# Patient Record
Sex: FEMALE | Race: WHITE | Employment: OTHER | URBAN - METROPOLITAN AREA
[De-identification: names, ages, dates, MRNs, and addresses within clinical notes are randomized per-mention and may not be internally consistent; named-entity substitution may affect disease eponyms.]

---

## 2017-08-07 ENCOUNTER — OFFICE VISIT (OUTPATIENT)
Dept: RHEUMATOLOGY | Facility: CLINIC | Age: 77
End: 2017-08-07

## 2017-08-07 VITALS
SYSTOLIC BLOOD PRESSURE: 160 MMHG | BODY MASS INDEX: 24.4 KG/M2 | HEIGHT: 62.5 IN | HEART RATE: 74 BPM | WEIGHT: 136 LBS | DIASTOLIC BLOOD PRESSURE: 100 MMHG | RESPIRATION RATE: 16 BRPM

## 2017-08-07 DIAGNOSIS — M79.7 FIBROMYALGIA: Primary | ICD-10-CM

## 2017-08-07 DIAGNOSIS — G90.523 REFLEX SYMPATHETIC DYSTROPHY OF BOTH LOWER EXTREMITIES: ICD-10-CM

## 2017-08-07 PROCEDURE — 99213 OFFICE O/P EST LOW 20 MIN: CPT | Performed by: INTERNAL MEDICINE

## 2017-08-07 NOTE — PROGRESS NOTES
[facility]  Dr. Mane Banks Progress Note     Subjective:   Girma Tejada is a(n) 69year old female. Current complaints: Patient presents with:  Fibromyalgia Syndrome: LOV 8/1/17. Pt states she is feeling well- pain level 1-2/10.     Pacing her acti

## 2017-08-07 NOTE — PATIENT INSTRUCTIONS
Continue to exercise as best you can. Avoid over doing activity, pace your activity. No NSAIDs or even tylenol use due to your sensitivity. Use otc nutritional productions as tolerated. Return to office in 1 year.

## 2018-08-06 ENCOUNTER — OFFICE VISIT (OUTPATIENT)
Dept: RHEUMATOLOGY | Facility: CLINIC | Age: 78
End: 2018-08-06
Payer: MEDICARE

## 2018-08-06 VITALS
WEIGHT: 135 LBS | HEART RATE: 72 BPM | SYSTOLIC BLOOD PRESSURE: 138 MMHG | RESPIRATION RATE: 16 BRPM | BODY MASS INDEX: 24 KG/M2 | DIASTOLIC BLOOD PRESSURE: 62 MMHG

## 2018-08-06 DIAGNOSIS — M21.611 BILATERAL BUNIONS: ICD-10-CM

## 2018-08-06 DIAGNOSIS — G90.523 REFLEX SYMPATHETIC DYSTROPHY OF BOTH LOWER EXTREMITIES: ICD-10-CM

## 2018-08-06 DIAGNOSIS — M79.7 FIBROMYALGIA: Primary | ICD-10-CM

## 2018-08-06 DIAGNOSIS — M21.612 BILATERAL BUNIONS: ICD-10-CM

## 2018-08-06 PROCEDURE — 99213 OFFICE O/P EST LOW 20 MIN: CPT | Performed by: INTERNAL MEDICINE

## 2018-08-06 NOTE — PATIENT INSTRUCTIONS
Exercise as tolerated. No NSAIDs or tylenol due to allergies. Well balance low fat diet. Use otc vitamins. See podiatrist about your bunions,  Dr Harjit Alvarez. Return to office  In 1 year.

## 2018-08-06 NOTE — PROGRESS NOTES
[facility]  Dr. Garcia Exon Progress Note     Subjective:   Araceli Marques is a(n) 69year old female. Current complaints: Patient presents with:  Fibromyalgia Syndrome: RSD 1 year f/u.  Pt states 'RSD is changing a little bit.'   Mild intermittent bur

## 2019-08-12 ENCOUNTER — OFFICE VISIT (OUTPATIENT)
Dept: RHEUMATOLOGY | Facility: CLINIC | Age: 79
End: 2019-08-12
Payer: MEDICARE

## 2019-08-12 VITALS
HEIGHT: 62.5 IN | BODY MASS INDEX: 24.22 KG/M2 | WEIGHT: 135 LBS | RESPIRATION RATE: 20 BRPM | HEART RATE: 82 BPM | DIASTOLIC BLOOD PRESSURE: 86 MMHG | SYSTOLIC BLOOD PRESSURE: 128 MMHG

## 2019-08-12 DIAGNOSIS — G90.523 REFLEX SYMPATHETIC DYSTROPHY OF BOTH LOWER EXTREMITIES: Primary | ICD-10-CM

## 2019-08-12 DIAGNOSIS — M79.7 FIBROMYALGIA: ICD-10-CM

## 2019-08-12 PROCEDURE — 99213 OFFICE O/P EST LOW 20 MIN: CPT | Performed by: INTERNAL MEDICINE

## 2019-08-12 NOTE — PATIENT INSTRUCTIONS
Deal with one problem at a time and make improvements. See Dr Arville Babinski of [facility] for cataract surgery. Eat well balance diet low in fat. Stay active as bet you can. No NSAIDs due to allergies/intolerance issues.   Return to office 1 ye

## 2019-08-12 NOTE — PROGRESS NOTES
[facility]  Dr. Domínguez Nguyễn Progress Note     Subjective:   Vanessa Castellon is a(n) 67year old female.    Current complaints: Patient presents with:  Fibromyalgia Syndrome: 1 year f/u, had a horrible year, husbands health taking a toll  August, Pat's hus

## 2020-08-10 ENCOUNTER — OFFICE VISIT (OUTPATIENT)
Dept: RHEUMATOLOGY | Facility: CLINIC | Age: 80
End: 2020-08-10
Payer: MEDICARE

## 2020-08-10 VITALS
HEART RATE: 96 BPM | WEIGHT: 131 LBS | SYSTOLIC BLOOD PRESSURE: 146 MMHG | BODY MASS INDEX: 23.5 KG/M2 | DIASTOLIC BLOOD PRESSURE: 92 MMHG | TEMPERATURE: 100 F | HEIGHT: 62.5 IN | RESPIRATION RATE: 18 BRPM

## 2020-08-10 DIAGNOSIS — M79.7 FIBROMYALGIA: Primary | ICD-10-CM

## 2020-08-10 DIAGNOSIS — G90.523 REFLEX SYMPATHETIC DYSTROPHY OF BOTH LOWER EXTREMITIES: ICD-10-CM

## 2020-08-10 PROCEDURE — 99213 OFFICE O/P EST LOW 20 MIN: CPT | Performed by: INTERNAL MEDICINE

## 2020-08-10 NOTE — PATIENT INSTRUCTIONS
Maintain activity as best you can. Daily exercises. Well balanced diet. Follow Covid virus guidelines. 8 hours of sleep a night if possible. Return to office 1 year.

## 2020-08-10 NOTE — PROGRESS NOTES
[facility]  Dr. Robbin Brooks Progress Note     Subjective:   Segrio García is a(n) 79year old female.    Current complaints: Patient presents with:  Fibromyalgia Syndrome: Annuual f/u, RSD and fibro under control if activity monitored  Continue unchange

## 2021-08-10 ENCOUNTER — OFFICE VISIT (OUTPATIENT)
Dept: RHEUMATOLOGY | Facility: CLINIC | Age: 81
End: 2021-08-10
Payer: MEDICARE

## 2021-08-10 VITALS
OXYGEN SATURATION: 97 % | HEIGHT: 62 IN | SYSTOLIC BLOOD PRESSURE: 138 MMHG | HEART RATE: 111 BPM | WEIGHT: 129.63 LBS | DIASTOLIC BLOOD PRESSURE: 86 MMHG | TEMPERATURE: 99 F | BODY MASS INDEX: 23.85 KG/M2

## 2021-08-10 DIAGNOSIS — G90.513 COMPLEX REGIONAL PAIN SYNDROME TYPE 1 OF BOTH UPPER EXTREMITIES: ICD-10-CM

## 2021-08-10 DIAGNOSIS — G90.523 REFLEX SYMPATHETIC DYSTROPHY OF BOTH LOWER EXTREMITIES: Primary | ICD-10-CM

## 2021-08-10 DIAGNOSIS — M79.7 FIBROMYALGIA: ICD-10-CM

## 2021-08-10 PROCEDURE — 99213 OFFICE O/P EST LOW 20 MIN: CPT | Performed by: INTERNAL MEDICINE

## 2021-08-10 NOTE — PROGRESS NOTES
[facility]  Dr. Ty Banks Progress Note     Subjective:   Mary Foster is a(n) [de-identified]year old female. Current complaints: Patient presents with: Follow - Up: annual visit;   in January;  Covid vaccine 21 caused Fibro and RSD to flare;

## 2021-08-10 NOTE — PATIENT INSTRUCTIONS
Continue to exercise regularly. Magnesium can help muscle spasms, also CoQ 10 100 mg might help. Continue Calcium and Vit D in MVI for your bones. Well balance diet. 8 hours a sleep a night. Keep stress under control. Return to office 1 year.

## 2021-08-11 ENCOUNTER — TELEPHONE (OUTPATIENT)
Dept: RHEUMATOLOGY | Facility: CLINIC | Age: 81
End: 2021-08-11

## 2021-08-12 ENCOUNTER — TELEPHONE (OUTPATIENT)
Dept: RHEUMATOLOGY | Facility: CLINIC | Age: 81
End: 2021-08-12

## 2021-08-12 PROBLEM — G90.513 COMPLEX REGIONAL PAIN SYNDROME TYPE 1 OF BOTH UPPER EXTREMITIES: Status: ACTIVE | Noted: 2021-08-12

## 2021-08-12 NOTE — TELEPHONE ENCOUNTER
Returned pt's call; pt states her AVS indicated RSD to BLE only. Pt wants this corrected to reflect RSD to BUE as well. Routing to Dr Mi Smith as Ivonne Alfaro.

## 2021-08-12 NOTE — TELEPHONE ENCOUNTER
Pt was in office yesterday for office appt, would like Dr. Yon Soto to add diagnosis of RDS to upper extremities.

## 2022-04-15 ENCOUNTER — TELEPHONE (OUTPATIENT)
Dept: RHEUMATOLOGY | Facility: CLINIC | Age: 82
End: 2022-04-15

## 2022-08-15 ENCOUNTER — OFFICE VISIT (OUTPATIENT)
Dept: RHEUMATOLOGY | Facility: CLINIC | Age: 82
End: 2022-08-15
Payer: MEDICARE

## 2022-08-15 VITALS
HEIGHT: 62 IN | HEART RATE: 99 BPM | WEIGHT: 118 LBS | SYSTOLIC BLOOD PRESSURE: 142 MMHG | BODY MASS INDEX: 21.71 KG/M2 | OXYGEN SATURATION: 96 % | TEMPERATURE: 100 F | DIASTOLIC BLOOD PRESSURE: 90 MMHG

## 2022-08-15 DIAGNOSIS — M79.7 FIBROMYALGIA: Primary | ICD-10-CM

## 2022-08-15 DIAGNOSIS — G90.513 COMPLEX REGIONAL PAIN SYNDROME TYPE 1 OF BOTH UPPER EXTREMITIES: ICD-10-CM

## 2022-08-15 DIAGNOSIS — G90.523 REFLEX SYMPATHETIC DYSTROPHY OF BOTH LOWER EXTREMITIES: ICD-10-CM

## 2022-08-15 PROBLEM — Z98.42 HISTORY OF LEFT CATARACT SURGERY: Status: ACTIVE | Noted: 2022-08-15

## 2022-08-15 PROCEDURE — 99214 OFFICE O/P EST MOD 30 MIN: CPT | Performed by: INTERNAL MEDICINE

## 2022-08-15 NOTE — PATIENT INSTRUCTIONS
Use a heating pad as needed. Trial of antioxiadants   OTC salves. Medical marijuana is a pain option -CBD. No narcotics or NSAIDs due to allergies and intolerance. Exercise and activity regularly. RTO 1 year.

## 2023-08-16 ENCOUNTER — OFFICE VISIT (OUTPATIENT)
Dept: RHEUMATOLOGY | Facility: CLINIC | Age: 83
End: 2023-08-16
Payer: MEDICARE

## 2023-08-16 VITALS
OXYGEN SATURATION: 96 % | WEIGHT: 111 LBS | DIASTOLIC BLOOD PRESSURE: 90 MMHG | HEIGHT: 62 IN | HEART RATE: 100 BPM | TEMPERATURE: 98 F | BODY MASS INDEX: 20.43 KG/M2 | SYSTOLIC BLOOD PRESSURE: 170 MMHG | RESPIRATION RATE: 16 BRPM

## 2023-08-16 DIAGNOSIS — G90.523 REFLEX SYMPATHETIC DYSTROPHY OF BOTH LOWER EXTREMITIES: ICD-10-CM

## 2023-08-16 DIAGNOSIS — G90.513 COMPLEX REGIONAL PAIN SYNDROME TYPE 1 OF BOTH UPPER EXTREMITIES: Primary | ICD-10-CM

## 2023-08-16 DIAGNOSIS — T50.Z95D ADVERSE EFFECT OF VACCINE, SUBSEQUENT ENCOUNTER: ICD-10-CM

## 2023-08-16 DIAGNOSIS — T50.B95A FATIGUE AFTER COVID-19 VACCINATION: ICD-10-CM

## 2023-08-16 DIAGNOSIS — M79.7 FIBROMYALGIA: ICD-10-CM

## 2023-08-16 DIAGNOSIS — R53.83 FATIGUE AFTER COVID-19 VACCINATION: ICD-10-CM

## 2023-08-16 PROBLEM — T50.Z95A VACCINATION REACTION: Status: ACTIVE | Noted: 2023-08-16

## 2023-08-16 PROCEDURE — 99213 OFFICE O/P EST LOW 20 MIN: CPT | Performed by: INTERNAL MEDICINE

## 2023-08-16 NOTE — PATIENT INSTRUCTIONS
Proper rest. Well balanced diet. Take Vitamin B supplements and multivitamins. Muscle spasm medication offered, but patient refuses it. Exercise regularly. Lyrica/cymbalta no help. Low ose naltrexone is an option for Fibromyalgia. RTO  1 year.

## 2024-01-21 ENCOUNTER — APPOINTMENT (OUTPATIENT)
Dept: GENERAL RADIOLOGY | Age: 84
End: 2024-01-21
Attending: EMERGENCY MEDICINE

## 2024-01-21 ENCOUNTER — APPOINTMENT (OUTPATIENT)
Dept: CT IMAGING | Age: 84
End: 2024-01-21
Attending: ORTHOPAEDIC SURGERY

## 2024-01-21 ENCOUNTER — APPOINTMENT (OUTPATIENT)
Dept: GENERAL RADIOLOGY | Age: 84
End: 2024-01-21
Attending: STUDENT IN AN ORGANIZED HEALTH CARE EDUCATION/TRAINING PROGRAM

## 2024-01-21 ENCOUNTER — HOSPITAL ENCOUNTER (EMERGENCY)
Age: 84
Discharge: HOME | End: 2024-01-21
Attending: STUDENT IN AN ORGANIZED HEALTH CARE EDUCATION/TRAINING PROGRAM | Admitting: INTERNAL MEDICINE

## 2024-01-21 VITALS
OXYGEN SATURATION: 96 % | RESPIRATION RATE: 10 BRPM | DIASTOLIC BLOOD PRESSURE: 88 MMHG | HEART RATE: 109 BPM | SYSTOLIC BLOOD PRESSURE: 148 MMHG | TEMPERATURE: 98.2 F | WEIGHT: 107.36 LBS

## 2024-01-21 DIAGNOSIS — S43.491A BANKART LESION OF RIGHT SHOULDER, INITIAL ENCOUNTER: ICD-10-CM
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
## 2024-01-21 PROBLEM — M79.7 FIBROMYALGIA: Status: ACTIVE | Noted: 2024-01-21

## 2024-01-21 PROBLEM — S43.004A SHOULDER DISLOCATION, RIGHT, INITIAL ENCOUNTER: Status: ACTIVE | Noted: 2024-01-21

## 2024-01-21 PROBLEM — I10 ESSENTIAL HYPERTENSION: Status: ACTIVE | Noted: 2022-01-07

## 2024-01-21 PROCEDURE — 73562 X-RAY EXAM OF KNEE 3: CPT

## 2024-01-21 PROCEDURE — 10002800 HB RX 250 W HCPCS: Performed by: STUDENT IN AN ORGANIZED HEALTH CARE EDUCATION/TRAINING PROGRAM

## 2024-01-21 PROCEDURE — 73200 CT UPPER EXTREMITY W/O DYE: CPT

## 2024-01-21 PROCEDURE — 10004180 HB COUNTER-TRANSPORT

## 2024-01-21 PROCEDURE — 73030 X-RAY EXAM OF SHOULDER: CPT

## 2024-01-21 RX ORDER — HYDRALAZINE HYDROCHLORIDE 20 MG/ML
10 INJECTION INTRAMUSCULAR; INTRAVENOUS EVERY 6 HOURS PRN
Status: CANCELLED | OUTPATIENT
Start: 2024-01-21

## 2024-01-21 RX ORDER — POLYETHYLENE GLYCOL 3350 17 G/17G
17 POWDER, FOR SOLUTION ORAL DAILY PRN
Status: CANCELLED | OUTPATIENT
Start: 2024-01-21

## 2024-01-21 RX ORDER — THIAMINE HCL 100 MG
1 TABLET ORAL DAILY
COMMUNITY

## 2024-01-21 RX ORDER — PROPOFOL 10 MG/ML
1 INJECTION, EMULSION INTRAVENOUS ONCE
Status: COMPLETED | OUTPATIENT
Start: 2024-01-21 | End: 2024-01-21

## 2024-01-21 RX ORDER — ONDANSETRON 4 MG/1
4 TABLET, ORALLY DISINTEGRATING ORAL EVERY 12 HOURS PRN
Status: CANCELLED | OUTPATIENT
Start: 2024-01-21

## 2024-01-21 RX ORDER — MULTIVITAMIN,THER AND MINERALS
1 TABLET ORAL DAILY
COMMUNITY

## 2024-01-21 RX ORDER — ONDANSETRON 2 MG/ML
4 INJECTION INTRAMUSCULAR; INTRAVENOUS EVERY 6 HOURS PRN
Status: CANCELLED | OUTPATIENT
Start: 2024-01-21

## 2024-01-21 RX ORDER — 0.9 % SODIUM CHLORIDE 0.9 %
2 VIAL (ML) INJECTION EVERY 12 HOURS SCHEDULED
Status: CANCELLED | OUTPATIENT
Start: 2024-01-21

## 2024-01-21 RX ORDER — LANOLIN ALCOHOL/MO/W.PET/CERES
3 CREAM (GRAM) TOPICAL NIGHTLY PRN
Status: CANCELLED | OUTPATIENT
Start: 2024-01-21

## 2024-01-21 RX ADMIN — PROPOFOL 49 MG: 10 INJECTION, EMULSION INTRAVENOUS at 16:06

## 2024-01-21 SDOH — SOCIAL STABILITY: SOCIAL INSECURITY: HOW OFTEN DOES ANYONE, INCLUDING FAMILY AND FRIENDS, PHYSICALLY HURT YOU?: NEVER

## 2024-01-21 SDOH — SOCIAL STABILITY: SOCIAL INSECURITY: HOW OFTEN DOES ANYONE, INCLUDING FAMILY AND FRIENDS, SCREAM OR CURSE AT YOU?: NEVER

## 2024-01-21 SDOH — SOCIAL STABILITY: SOCIAL INSECURITY: HOW OFTEN DOES ANYONE, INCLUDING FAMILY AND FRIENDS, INSULT OR TALK DOWN TO YOU?: NEVER

## 2024-01-21 ASSESSMENT — PAIN SCALES - GENERAL
PAINLEVEL_OUTOF10: 0
PAINLEVEL_OUTOF10: 5

## 2024-01-21 ASSESSMENT — LIFESTYLE VARIABLES: SMOKING_HISTORY: NO

## 2024-01-29 ENCOUNTER — LAB ENCOUNTER (OUTPATIENT)
Dept: LAB | Age: 84
End: 2024-01-29
Attending: ORTHOPAEDIC SURGERY
Payer: MEDICARE

## 2024-01-29 DIAGNOSIS — Z01.818 PRE-OP TESTING: ICD-10-CM

## 2024-01-29 PROCEDURE — 87081 CULTURE SCREEN ONLY: CPT

## 2024-02-02 ENCOUNTER — APPOINTMENT (OUTPATIENT)
Dept: GENERAL RADIOLOGY | Facility: HOSPITAL | Age: 84
End: 2024-02-02
Attending: ORTHOPAEDIC SURGERY
Payer: MEDICARE

## 2024-02-02 ENCOUNTER — ANESTHESIA (OUTPATIENT)
Dept: SURGERY | Facility: HOSPITAL | Age: 84
End: 2024-02-02
Payer: MEDICARE

## 2024-02-02 ENCOUNTER — HOSPITAL ENCOUNTER (OUTPATIENT)
Facility: HOSPITAL | Age: 84
Setting detail: HOSPITAL OUTPATIENT SURGERY
Discharge: HOME | End: 2024-02-02
Attending: ORTHOPAEDIC SURGERY | Admitting: ORTHOPAEDIC SURGERY
Payer: MEDICARE

## 2024-02-02 ENCOUNTER — ANESTHESIA EVENT (OUTPATIENT)
Dept: SURGERY | Facility: HOSPITAL | Age: 84
End: 2024-02-02
Payer: MEDICARE

## 2024-02-02 VITALS
HEART RATE: 87 BPM | DIASTOLIC BLOOD PRESSURE: 89 MMHG | BODY MASS INDEX: 19.32 KG/M2 | HEIGHT: 62 IN | WEIGHT: 105 LBS | RESPIRATION RATE: 16 BRPM | TEMPERATURE: 98 F | SYSTOLIC BLOOD PRESSURE: 170 MMHG | OXYGEN SATURATION: 93 %

## 2024-02-02 DIAGNOSIS — Z01.818 PRE-OP TESTING: Primary | ICD-10-CM

## 2024-02-02 PROCEDURE — 64415 NJX AA&/STRD BRCH PLXS IMG: CPT | Performed by: ORTHOPAEDIC SURGERY

## 2024-02-02 PROCEDURE — 0PS704Z REPOSITION RIGHT GLENOID CAVITY WITH INTERNAL FIXATION DEVICE, OPEN APPROACH: ICD-10-PCS | Performed by: ORTHOPAEDIC SURGERY

## 2024-02-02 RX ORDER — HYDROMORPHONE HYDROCHLORIDE 1 MG/ML
0.6 INJECTION, SOLUTION INTRAMUSCULAR; INTRAVENOUS; SUBCUTANEOUS EVERY 5 MIN PRN
OUTPATIENT
Start: 2024-02-02

## 2024-02-02 RX ORDER — NALOXONE HYDROCHLORIDE 0.4 MG/ML
80 INJECTION, SOLUTION INTRAMUSCULAR; INTRAVENOUS; SUBCUTANEOUS AS NEEDED
OUTPATIENT
Start: 2024-02-02 | End: 2024-02-02

## 2024-02-02 RX ORDER — ONDANSETRON 2 MG/ML
4 INJECTION INTRAMUSCULAR; INTRAVENOUS EVERY 6 HOURS PRN
OUTPATIENT
Start: 2024-02-02

## 2024-02-02 RX ORDER — HYDROMORPHONE HYDROCHLORIDE 1 MG/ML
0.4 INJECTION, SOLUTION INTRAMUSCULAR; INTRAVENOUS; SUBCUTANEOUS EVERY 5 MIN PRN
OUTPATIENT
Start: 2024-02-02

## 2024-02-02 RX ORDER — SODIUM CHLORIDE, SODIUM LACTATE, POTASSIUM CHLORIDE, CALCIUM CHLORIDE 600; 310; 30; 20 MG/100ML; MG/100ML; MG/100ML; MG/100ML
INJECTION, SOLUTION INTRAVENOUS CONTINUOUS
OUTPATIENT
Start: 2024-02-02

## 2024-02-02 RX ORDER — HYDROMORPHONE HYDROCHLORIDE 1 MG/ML
0.2 INJECTION, SOLUTION INTRAMUSCULAR; INTRAVENOUS; SUBCUTANEOUS EVERY 5 MIN PRN
OUTPATIENT
Start: 2024-02-02

## 2024-02-02 RX ORDER — LABETALOL HYDROCHLORIDE 5 MG/ML
INJECTION, SOLUTION INTRAVENOUS AS NEEDED
Status: DISCONTINUED | OUTPATIENT
Start: 2024-02-02 | End: 2024-02-02 | Stop reason: SURG

## 2024-02-02 RX ORDER — MORPHINE SULFATE 4 MG/ML
4 INJECTION, SOLUTION INTRAMUSCULAR; INTRAVENOUS EVERY 10 MIN PRN
OUTPATIENT
Start: 2024-02-02

## 2024-02-02 RX ORDER — SODIUM CHLORIDE, SODIUM LACTATE, POTASSIUM CHLORIDE, CALCIUM CHLORIDE 600; 310; 30; 20 MG/100ML; MG/100ML; MG/100ML; MG/100ML
INJECTION, SOLUTION INTRAVENOUS CONTINUOUS
Status: DISCONTINUED | OUTPATIENT
Start: 2024-02-02 | End: 2024-02-02

## 2024-02-02 RX ORDER — ONDANSETRON 2 MG/ML
4 INJECTION INTRAMUSCULAR; INTRAVENOUS EVERY 6 HOURS PRN
Status: DISCONTINUED | OUTPATIENT
Start: 2024-02-02 | End: 2024-02-02

## 2024-02-02 RX ORDER — MORPHINE SULFATE 2 MG/ML
2 INJECTION, SOLUTION INTRAMUSCULAR; INTRAVENOUS EVERY 10 MIN PRN
OUTPATIENT
Start: 2024-02-02

## 2024-02-02 RX ORDER — ROPIVACAINE HYDROCHLORIDE 5 MG/ML
INJECTION, SOLUTION EPIDURAL; INFILTRATION; PERINEURAL
Status: COMPLETED | OUTPATIENT
Start: 2024-02-02 | End: 2024-02-02

## 2024-02-02 RX ORDER — MORPHINE SULFATE 10 MG/ML
6 INJECTION, SOLUTION INTRAMUSCULAR; INTRAVENOUS EVERY 10 MIN PRN
OUTPATIENT
Start: 2024-02-02

## 2024-02-02 RX ORDER — METOCLOPRAMIDE HYDROCHLORIDE 5 MG/ML
10 INJECTION INTRAMUSCULAR; INTRAVENOUS EVERY 8 HOURS PRN
OUTPATIENT
Start: 2024-02-02

## 2024-02-02 RX ADMIN — LABETALOL HYDROCHLORIDE 10 MG: 5 INJECTION, SOLUTION INTRAVENOUS at 12:55:00

## 2024-02-02 RX ADMIN — ROPIVACAINE HYDROCHLORIDE 25 ML: 5 INJECTION, SOLUTION EPIDURAL; INFILTRATION; PERINEURAL at 11:22:00

## 2024-02-02 NOTE — DISCHARGE INSTRUCTIONS
Call us after your see your PCP and have your blood pressure checked    [facility] INSTRUCTIONS: POST-OP ANESTHESIA  The medication that you received for sedation or general anesthesia can last up to 24 hours. Your judgment and reflexes may be altered, even if you feel like your normal self.      We Recommend:   Do not drive any motor vehicle or bicycle   Avoid mowing the lawn, playing sports, or working with power tools/applicances (power saws, electric knives or mixers)   That you have someone stay with you on your first night home   Do not drink alcohol or take sleeping pills or tranquilizers   Do not sign legal documents within 24 hours of your procedure   If you had a nerve block for your surgery, take extra care not to put any pressure on your arm or hand for 24 hours    It is normal:  For you to have a sore throat if you had a breathing tube during surgery (while you were asleep!). The sore throat should get better within 48 hours. You can gargle with warm salt water (1/2 tsp in 4 oz warm water) or use a throat lozenge for comfort  To feel muscle aches or soreness especially in the abdomen, chest or neck. The achy feeling should go away in the next 24 hours  To feel weak, sleepy or \"wiped out\". Your should start feeling better in the next 24 hours.   To experience mild discomforts such as sore lip or tongue, headache, cramps, gas pains or a bloated feeling in your abdomen.   To experience mild back pain or soreness for a day or two if you had spinal or epidural anesthesia.   If you had laparoscopic surgery, to feel shoulder pain or discomfort on the day of surgery.   For some patients to have nausea after surgery/anesthesia    If you feel nausea or experience vomiting:   Try to move around less.   Eat less than usual or drink only liquids until the next morning   Nausea should resolve in about 24 hours    If you have a problem when you are at home:    Call your surgeons office   Discharge Instructions:  After Your Surgery  You’ve just had surgery. During surgery, you were given medicine called anesthesia to keep you relaxed and free of pain. After surgery, you may have some pain or nausea. This is common. Here are some tips for feeling better and getting well after surgery.   Going home  Your healthcare provider will show you how to take care of yourself when you go home. They'll also answer your questions. Have an adult family member or friend drive you home. For the first 24 hours after your surgery:   Don't drive or use heavy equipment.  Don't make important decisions or sign legal papers.  Take medicines as directed.  Don't drink alcohol.  Have someone stay with you, if needed. They can watch for problems and help keep you safe.  Be sure to go to all follow-up visits with your healthcare provider. And rest after your surgery for as long as your provider tells you to.   Coping with pain  If you have pain after surgery, pain medicine will help you feel better. Take it as directed, before pain becomes severe. Also, ask your healthcare provider or pharmacist about other ways to control pain. This might be with heat, ice, or relaxation. And follow any other instructions your surgeon or nurse gives you.      Stay on schedule with your medicine.     Tips for taking pain medicine  To get the best relief possible, remember these points:   Pain medicines can upset your stomach. Taking them with a little food may help.  Most pain relievers taken by mouth need at least 20 to 30 minutes to start to work.  Don't wait till your pain becomes severe before you take your medicine. Try to time your medicine so that you can take it before starting an activity. This might be before you get dressed, go for a walk, or sit down for dinner.  Constipation is a common side effect of some pain medicines. Call your healthcare provider before taking any medicines such as laxatives or stool softeners to help ease constipation. Also ask if  you should skip any foods. Drinking lots of fluids and eating foods such as fruits and vegetables that are high in fiber can also help. Remember, don't take laxatives unless your surgeon has prescribed them.  Drinking alcohol and taking pain medicine can cause dizziness and slow your breathing. It can even be deadly. Don't drink alcohol while taking pain medicine.  Pain medicine can make you react more slowly to things. Don't drive or run machinery while taking pain medicine.  Your healthcare provider may tell you to take acetaminophen to help ease your pain. Ask them how much you're supposed to take each day. Acetaminophen or other pain relievers may interact with your prescription medicines or other over-the-counter (OTC) medicines. Some prescription medicines have acetaminophen and other ingredients in them. Using both prescription and OTC acetaminophen for pain can cause you to accidentally overdose. Read the labels on your OTC medicines with care. This will help you to clearly know the list of ingredients, how much to take, and any warnings. It may also help you not take too much acetaminophen. If you have questions or don't understand the information, ask your pharmacist or healthcare provider to explain it to you before you take the OTC medicine.   Managing nausea  Some people have an upset stomach (nausea) after surgery. This is often because of anesthesia, pain, or pain medicine, less movement of food in the stomach, or the stress of surgery. These tips will help you handle nausea and eat healthy foods as you get better. If you were on a special food plan before surgery, ask your healthcare provider if you should follow it while you get better. Check with your provider on how your eating should progress. It may depend on the surgery you had. These general tips may help:   Don't push yourself to eat. Your body will tell you when to eat and how much.  Start off with clear liquids and soup. They're easier to  digest.  Next try semi-solid foods as you feel ready. These include mashed potatoes, applesauce, and gelatin.  Slowly move to solid foods. Don’t eat fatty, rich, or spicy foods at first.  Don't force yourself to have 3 large meals a day. Instead eat smaller amounts more often.  Take pain medicines with a small amount of solid food, such as crackers or toast. This helps prevent nausea.  When to call your healthcare provider  Call your healthcare provider right away if you have any of these:   You still have too much pain, or the pain gets worse, after taking the medicine. The medicine may not be strong enough. Or there may be a complication from the surgery.  You feel too sleepy, dizzy, or groggy. The medicine may be too strong.  Side effects such as nausea or vomiting. Your healthcare provider may advise taking other medicines to .  Skin changes such as rash, itching, or hives. This may mean you have an allergic reaction. Your provider may advise taking other medicines.  The incision looks different (for instance, part of it opens up).  Bleeding or fluid leaking from the incision site, and weren't told to expect that.  Fever of 100.4°F (38°C) or higher, or as directed by your provider.  Call 911  Call 911 right away if you have:   Trouble breathing  Facial swelling    If you have obstructive sleep apnea   You were given anesthesia medicine during surgery to keep you comfortable and free of pain. After surgery, you may have more apnea spells because of this medicine and other medicines you were given. The spells may last longer than normal.    At home:  Keep using the continuous positive airway pressure (CPAP) device when you sleep. Unless your healthcare provider tells you not to, use it when you sleep, day or night. CPAP is a common device used to treat obstructive sleep apnea.  Talk with your provider before taking any pain medicine, muscle relaxants, or sedatives. Your provider will tell you about the possible  dangers of taking these medicines.  Contact your provider if your sleeping changes a lot even when taking medicines as directed.  Ron last reviewed this educational content on 10/1/2021  © 3679-5488 [employer]. All rights reserved. This information is not intended as a substitute for professional medical care. Always follow your healthcare professional's instructions.

## 2024-02-02 NOTE — ANESTHESIA PREPROCEDURE EVALUATION
Anesthesia PreOp Note    HPI:     Dulce Nicolas is a 83 year old female who presents for preoperative consultation requested by: Gabriella Murry MD    Date of Surgery: 2/2/2024    Procedure(s):  Right glenoid open reduction internal fixation with possible trillat procedure  Indication: Anterior dislocation of right shoulder, initial encounter, acute pain of right shoulder, glenoid fracture of shoulder, right, closed initial encounter    Relevant Problems   No relevant active problems       NPO:                         History Review:  Patient Active Problem List    Diagnosis Date Noted    Vaccination reaction 08/16/2023    Fatigue after COVID-19 vaccination 08/16/2023    Histroy of left cataract surgery 2-10-22 08/15/2022    Complex regional pain syndrome type 1 of both upper extremities 08/12/2021    Bilateral bunions 08/06/2018    Fibromyalgia 08/01/2016    Reflex sympathetic dystrophy of both lower extremities 08/01/2016    Varicose veins of both lower extremities 08/01/2016    Primary localized osteoarthrosis, hand 01/15/2015    Contusion of right hand 01/15/2015    Sprain of left thumb 01/15/2015    Residual foreign body in soft tissue 06/20/2013       Past Medical History:   Diagnosis Date    Cataract     Esophageal reflux     gets dilated     Fibromyalgia     Hearing impairment     bilateral hearing aids    Pneumonia due to organism     age 4. and in 1996     PONV (postoperative nausea and vomiting)     RSD (reflex sympathetic dystrophy) 1996       Past Surgical History:   Procedure Laterality Date    ANKLE FRACTURE SURGERY      Rt X 2    CATARACT      Corneal Transplants    OTHER SURGICAL HISTORY      Rt 3rd finger after tip was amputated    OTHER SURGICAL HISTORY      cysts removed pilondial     UPPER GI ENDOSCOPY,EXAM         Medications Prior to Admission   Medication Sig Dispense Refill Last Dose    HONEY OR Take by mouth. 1 tsp daily   Past Week    Homeopathic Products (COLDCALM SL) Place under the  tongue daily as needed.   Past Week    MULTI-VITAMIN OR None Entered   Past Week    CALCIUM + D OR None Entered   2/1/2024    MAGNESIUM OR None Entered   2/1/2024    VITAMIN C OR Take by mouth at bedtime.   Past Week     Current Facility-Administered Medications Ordered in Epic   Medication Dose Route Frequency Provider Last Rate Last Admin    lactated ringers infusion   Intravenous Continuous Gabriella Murry MD        vancomycin (Vancocin) 750 mg in sodium chloride 0.9% 250 mL IVPB-ADDV  15 mg/kg Intravenous Once Gabriella Murry MD        ondansetron (Zofran) 4 MG/2ML injection 4 mg  4 mg Intravenous Q6H PRN Gabriella Murry MD         No current [facility]-ordered outpatient medications on file.       Allergies   Allergen Reactions    Asa [Aspirin] OTHER (SEE COMMENTS)     All NSAIDS     Bee SWELLING    Beck Rdz Daytime [Menthol] OTHER (SEE COMMENTS)     Rash , itching, chills n/v    Chlorine OTHER (SEE COMMENTS)     HA rash, itching     Ciprofloxacin ANAPHYLAXIS    Clindamycin Hcl ANAPHYLAXIS     Gave heart palp, chest pain , HA     Codeine [Opioid Analgesics] OTHER (SEE COMMENTS)     Rash itching HA chills    Crabs (Crustaceans) ANAPHYLAXIS     Throat swelling    Cranberry ANAPHYLAXIS     Watermelon causes throat closing     Darvon [Propoxyphene] OTHER (SEE COMMENTS)     Chills, fever, HA itching     Doxycycline OTHER (SEE COMMENTS)     Caused her to have muscle aches/pains , unable to move hands/ feet , caused memory issues     Dulcolax OTHER (SEE COMMENTS)     Caused insides to burn     Elavil [Amitriptyline Hcl] OTHER (SEE COMMENTS)     Worse symptms of RSD     Entex ANAPHYLAXIS     Same as Elivail     Entex La OTHER (SEE COMMENTS)     Passed out, N/V , diarrhea     Erythromycin OTHER (SEE COMMENTS)     Passing out / HTN  Went to ER     Ibuprofen OTHER (SEE COMMENTS)     Allergic to ALL NSAIDS, caused bleeding, chills rash     Iodoform OTHER (SEE COMMENTS)     Iodine caused buring, bleeding     Keflex  ANAPHYLAXIS    Lovenox [Enoxaparin] OTHER (SEE COMMENTS)     Caused bruising/ bleeding     Neurontin [Gabapentin] OTHER (SEE COMMENTS)     Caused worse symptoms of RSD     Norflex [Orphenadrine Citrate Cr] OTHER (SEE COMMENTS)     Caused worse symptoms of RSD     Other ANAPHYLAXIS     HYDROCHLORIC ACID/HCL, neoprene caused cellultis , ARTIFICAL SWEETNERS  Cause n/v diarrhea   Cant use eye drops     Penicillins ANAPHYLAXIS    Prednisolone OTHER (SEE COMMENTS)     Eye gtts - increased BP    Prilosec [Omeprazole Magnesium] ANAPHYLAXIS     Cardiac issues     Silicone SWELLING     Cellulitis    Sulfa Antibiotics ANAPHYLAXIS    Tetracycline [Tetracycline Hcl] ANAPHYLAXIS     Doxycycline caused RSD / fibromyalgia     Triamcinolone OTHER (SEE COMMENTS)     caused cellultits all over     Tylenol [Acetaminophen] ANAPHYLAXIS     Fainting had to go to ER     Ultram [Tramadol Hcl] OTHER (SEE COMMENTS)     Chills, rash itching HA, caused worse RSD symptoms     Alcohol RASH and NAUSEA AND VOMITING    Fish Oil OTHER (SEE COMMENTS)     Vitamin e caused chills, nv/ HA ,     Kaolin OTHER (SEE COMMENTS)     N/v , diarrhea     Dust     Loteprednol OTHER (SEE COMMENTS)     High blood pressure, double vision    Mold OTHER (SEE COMMENTS)     Caused rash  Chill, Kennedy itching     Valium OTHER (SEE COMMENTS)     HA, rash , chills, itching     Chlorpheniramine NAUSEA ONLY    Diphenhydramine RASH     cream    Tea Tree Oil ITCHING       Family History   Problem Relation Age of Onset    Heart Attack Father     Breast Cancer Mother     Breast Cancer Sister     Heart Attack Brother     Diabetes Sister      Social History     Socioeconomic History    Marital status:    Tobacco Use    Smoking status: Never    Smokeless tobacco: Never   Vaping Use    Vaping Use: Never used   Substance and Sexual Activity    Alcohol use: No    Drug use: No   Other Topics Concern    Seat Belt Yes       Available pre-op labs reviewed.             Vital Signs:  Body  mass index is 19.57 kg/m².   height is 1.575 m (5' 2\") and weight is 48.5 kg (107 lb).   Vitals:    01/27/24 1017   Weight: 48.5 kg (107 lb)   Height: 1.575 m (5' 2\")        Anesthesia Evaluation     Patient summary reviewed and Nursing notes reviewed    No history of anesthetic complications   Airway   Mallampati: II  TM distance: >3 FB  Neck ROM: full  Dental      Pulmonary - negative ROS    breath sounds clear to auscultation  (-) COPD, asthma, sleep apnea  Cardiovascular - negative ROS  Exercise tolerance: good  (-) hypertension, CAD, CHF    Rhythm: regular  Rate: normal    Neuro/Psych    (-) CVA    Comments: CPRS    GI/Hepatic/Renal    (+) GERD  (-) liver disease, renal disease    Endo/Other - negative ROS   (-) diabetes mellitus, hypothyroidism  Abdominal                  Anesthesia Plan:   ASA:  2  Plan:   General and regional  Airway:  ETT  Post-op Pain Management: Interscalene block  Plan Comments: Pt and daughter had numerous questions and hesitancy regarding receiving general anesthesia given that pt has multiple allergies. Pt does not want any antiemetic medication.   Informed Consent Plan and Risks Discussed With:  Patient  Consent Comment: Discussed risks of peripheral nerve block including nerve injury, hematoma, infection, Mariaelena's syndrome, pneumothorax, phrenic nerve palsy.  Discussed plan with:  CRNA      I have informed Dulce Nicolas and/or legal guardian or family member of the nature of the anesthetic plan, benefits, risks including possible dental damage if relevant, major complications, and any alternative forms of anesthetic management.   All of the patient's questions were answered to the best of my ability. The patient desires the anesthetic management as planned.  Willy Beauchamp MD  2/2/2024 10:28 AM  Present on Admission:  **None**

## 2024-02-02 NOTE — H&P
Dulce SHEIKH Maria Isabel  6/27/1940  83 year old   female  Gabriella Murry MD     HPI:   Patient presents with:  Right Shoulder - Pain, Dislocation, Fracture        The patient is ambidextrous.  Date of injury/ onset of symptoms: 1/20/24 Patient tripped and fell onto her right arm while at home.  The patient complains of pain located in her right shoulder.  The pain is graded as severe.  The patient's pain occurs with use.  The patient denies numbness and tingling.  The patient denies skin laceration or breakdown.  Prior treatments have included reduction, sling and ice.     ALLERGIES:     Asa [Aspirin]           OTHER (SEE COMMENTS)    Comment:All NSAIDS  Bee                     SWELLING  Beck Rdz Daytime [Me*    OTHER (SEE COMMENTS)    Comment:Rash , itching, chills n/v  Bisacodyl               OTHER (SEE COMMENTS)    Comment:Caused insides to burn  Cephalexin              ANAPHYLAXIS  Chlorine                OTHER (SEE COMMENTS)    Comment:HA rash, itching  Ciprofloxacin           ANAPHYLAXIS  Clindamycin Hcl         ANAPHYLAXIS    Comment:Gave heart palp, chest pain , HA  Codeine [Morphine A*    OTHER (SEE COMMENTS)    Comment:Rash itching HA chills  Crabs (Crustaceans)     ANAPHYLAXIS    Comment:Throat swelling  Cranberry               ANAPHYLAXIS    Comment:Watermelon causes throat closing  Darvon [Propoxyphen*    OTHER (SEE COMMENTS)    Comment:Chills, fever, HA itching  Doxycycline             OTHER (SEE COMMENTS)    Comment:Caused her to have muscle aches/pains , unable to             move hands/ feet , caused memory issues  Elavil [Amitriptyli*    OTHER (SEE COMMENTS)    Comment:Worse symptms of RSD  Entex                   ANAPHYLAXIS    Comment:Same as Elivail  Entex La                OTHER (SEE COMMENTS)    Comment:Passed out, N/V , diarrhea  Erythromycin            OTHER (SEE COMMENTS)    Comment:Passing out / HTN  Went to ER  Ibuprofen               OTHER (SEE COMMENTS)    Comment:Allergic to ALL NSAIDS,  caused bleeding, chills             rash  Iodoform                OTHER (SEE COMMENTS)    Comment:Iodine caused buring, bleeding  Lovenox [Enoxaparin]    OTHER (SEE COMMENTS)    Comment:Caused bruising/ bleeding  Neurontin [Gabapent*    OTHER (SEE COMMENTS)    Comment:Caused worse symptoms of RSD  Norflex [Orphenadri*    OTHER (SEE COMMENTS)    Comment:Caused worse symptoms of RSD  Other                   ANAPHYLAXIS    Comment:HYDROCHLORIC ACID/HCL, neoprene caused cellultis ,             ARTIFICAL SWEETNERS  Cause n/v diarrhea Cant use             eye drops  Penicillins             ANAPHYLAXIS  Prednisolone            SWELLING    Comment:   EYE DROPS : Swelling of face, High BP, burning             sensation on eyes  Prilosec [Omeprazol*    ANAPHYLAXIS    Comment:Cardiac issues  Sulfa Antibiotics       ANAPHYLAXIS  Tetracycline [Tetra*    ANAPHYLAXIS    Comment:Doxycycline caused RSD / fibromyalgia  Triamcinolone           OTHER (SEE COMMENTS)    Comment:caused cellultits all over  Tylenol [Acetaminop*    ANAPHYLAXIS    Comment:Fainting had to go to ER  Ultram [Tramadol Hc*    OTHER (SEE COMMENTS)    Comment:Chills, rash itching HA, caused worse RSD symptoms  Valium                  OTHER (SEE COMMENTS)    Comment:HA, rash , chills, itching  Alcohol                 RASH, NAUSEA AND VOMITING  Dust                    OTHER (SEE COMMENTS)    Comment:headache  Fish Oil                OTHER (SEE COMMENTS)    Comment:Vitamin e caused chills, nv/ HA ,  Kaolin                  OTHER (SEE COMMENTS)    Comment:N/v , diarrhea  Mold                    OTHER (SEE COMMENTS)    Comment:Caused rash  Chill, Ha itching  Ofloxacin               OTHER (SEE COMMENTS)    Comment:Severe allergy to fluoroquinolones  Chlorpheniramine        NAUSEA ONLY  Diphenhydramine         RASH    Comment:cream          Current Outpatient Medications   Medication Sig Dispense Refill    Homeopathic Products (COLDCALM SL) Place under the tongue.         MULTI-VITAMIN OR None Entered        CALCIUM + D OR None Entered        MAGNESIUM OR None Entered        VITAMIN C OR None Entered          HISTORY:       Past Medical History:   Diagnosis Date    Esophageal reflux       gets dilated     Fibromyalgia      Pneumonia due to organism       age 4. and in 1996     PONV (postoperative nausea and vomiting)      RSD (reflex sympathetic dystrophy)              Past Surgical History:   Procedure Laterality Date    ANKLE FRACTURE SURGERY         Rt X 2    CATARACT   2022    OTHER SURGICAL HISTORY         Rt 3rd finger after tip was amputated    OTHER SURGICAL HISTORY         cysts removed pilondial     UPPER GI ENDOSCOPY,EXAM                Family History   Problem Relation Age of Onset    Heart Attack Father      Breast Cancer Mother      Breast Cancer Sister      Heart Attack Brother      Diabetes Sister        Social History    Tobacco Use      Smoking status: Never      Smokeless tobacco: Never    Vaping Use      Vaping Use: Never used    Alcohol use: No    Drug use: No            REVIEW OF SYSTEMS:   A 12 point review of systems was performed as documented on the intake form and reviewed by me today with pertinent positives and negatives listed in the HPI.     EXAM:   Ht 5' 2\" (1.575 m)   Wt 107 lb 8 oz (48.8 kg)   BMI 19.66 kg/m²   The patient is awake and oriented x 3 and overall well appearing.  The patient has normal mood.  The patient is non-tender and atraumatic with the exception of their right upper extremity.  The patient's skin is intact and compartments are soft.  The patient's upper extremities are warm and pink with brisk capillary refill and 2+ radial pulses.  Sensation is intact to light touch in axillary, median, ulnar, and radial nerve distributions.  The patient has 5/5 strength in finger flexion, finger extension, EPL, FPL, and interosseous muscle function.  The patient has tenderness to palpation at the right shoulder with associated edema and  ecchymosis.  There is no evidence of clinical deformity.  She has elevation to 90 degrees, external rotation to 40 degrees, and internal rotation to L5. She has crepitus. She has increased anterior translation.  The patient has 5/5 strength in elevation, external rotation, and internal rotation.        IMAGING AND TESTING:  Xrays and CT of her right shoulder independently reviewed by me today reveals a comminuted glenoid fracture with anterior inferior subluxation of her humeral head        ASSESSMENT AND PLAN:   Anterior dislocation of right shoulder, initial encounter  (primary encounter diagnosis)  Acute pain of right shoulder  Glenoid fracture of shoulder, right, closed, initial encounter     The risks, indications, benefits, and procedures of both operative and non-operative treatment were discussed with the patient.     The patient desired surgery.  Surgery recommended was right glenoid open reduction internal fixation with possible Trillat procedure.  Risks include bleeding, infection, neurovascular injury, failure of the procedure, stiffness, and potential need for additional surgery as well as anesthetic complications including death.  Risks of fracture care include malunion, nonunion, delayed union, and post-traumatic arthritis. The patient consented to the procedure.   She will rest in an ultrasling that she will also use after surgery     All of their questions were answered and they are in agreement with the treatment plan.     The patient will return to clinic in 2 weeks postop for further clinical and radiographic evaluation with right shoulder Grashey, outlet, and Valpeau views.

## 2024-02-02 NOTE — ANESTHESIA POSTPROCEDURE EVALUATION
Patient: Dulce Nicolas    Procedure Summary       Date: 02/02/24 Room / Location: [facility] MAIN OR 05 / [facility] MAIN OR    Anesthesia Start: 1242 Anesthesia Stop:     Procedure: Right glenoid open reduction internal fixation with possible trillat procedure (Right) Diagnosis: (Anterior dislocation of right shoulder, initial encounter, acute pain of right shoulder, glenoid fracture of shoulder, right, closed initial encounter)    Surgeons: Gabriella Murry MD Anesthesiologist: Willy Beauchamp MD    Anesthesia Type: general, regional ASA Status: 2            Anesthesia Type: general, regional    Vitals Value Taken Time   /93 02/02/24 1309   Temp 98.6 °F (37 °C) 02/02/24 1309   Pulse 87 02/02/24 1309   Resp 18 02/02/24 1309   SpO2 92 % 02/02/24 1309   Vitals shown include unfiled device data.    EM AN Post Evaluation:   Patient Evaluated in PACU  Patient Participation: complete - patient participated  Level of Consciousness: awake and alert  Pain Score: 0  Pain Management: adequate  Airway Patency:patent  Dental exam unchanged from preop  Yes    Cardiovascular Status: hypertensive  Respiratory Status: room air  Postoperative Hydration stable      Erlinda Villegas, ODETTE  2/2/2024 1:09 PM

## 2024-02-02 NOTE — ANESTHESIA PROCEDURE NOTES
Peripheral Block    Date/Time: 2/2/2024 11:20 AM    Performed by: Willy Beauchamp MD  Authorized by: Willy Beauchamp MD      General Information and Staff    Start Time:  2/2/2024 11:20 AM  End Time:  2/2/2024 11:24 AM  Anesthesiologist:  Willy Beauchamp MD  Performed by:  Anesthesiologist  Patient Location:  PACU    Block Placement: Pre Induction  Site Identification: real time ultrasound guided and image stored and retrievable      Reason for Block: at surgeon's request and post-op pain management    Preanesthetic Checklist: 2 patient identifers, IV checked, risks and benefits discussed, monitors and equipment checked, pre-op evaluation, timeout performed, anesthesia consent and sterile technique used      Procedure Details    Patient Position:  Sitting  Prep: ChloraPrep    Monitoring:  Cardiac monitor  Block Type:  Interscalene  Laterality:  Right  Injection Technique:  Single-shot    Needle    Needle Type:  Short-bevel  Needle Gauge:  22 G  Needle Length:  50 mm  Needle Localization:  Ultrasound guidance and nerve stimulator  Reason for Ultrasound Use: appropriate spread of the medication was noted in real time and no ultrasound evidence of intravascular and/or intraneural injection      Muscle Twitch Response: flexor carpi radialis response      Assessment    Injection Assessment:  Good spread noted, incremental injection, local visualized surrounding nerve on ultrasound, low pressure, negative aspiration for heme and no pain on injection  Heart Rate Change: No        Medications  2/2/2024 11:20 AM  ropivacaine (NAROPIN) injection 0.5% - Infiltration   25 mL - 2/2/2024 11:22:00 AM    Additional Comments

## 2024-02-12 RX ORDER — CARVEDILOL 12.5 MG/1
12.5 TABLET ORAL 2 TIMES DAILY WITH MEALS
COMMUNITY

## 2024-02-16 ENCOUNTER — HOSPITAL ENCOUNTER (OUTPATIENT)
Facility: HOSPITAL | Age: 84
Discharge: HOME | End: 2024-02-17
Attending: ORTHOPAEDIC SURGERY | Admitting: ORTHOPAEDIC SURGERY
Payer: MEDICARE

## 2024-02-16 ENCOUNTER — ANESTHESIA EVENT (OUTPATIENT)
Dept: SURGERY | Facility: HOSPITAL | Age: 84
End: 2024-02-16
Payer: MEDICARE

## 2024-02-16 ENCOUNTER — APPOINTMENT (OUTPATIENT)
Dept: GENERAL RADIOLOGY | Facility: HOSPITAL | Age: 84
End: 2024-02-16
Attending: ORTHOPAEDIC SURGERY
Payer: MEDICARE

## 2024-02-16 ENCOUNTER — ANESTHESIA (OUTPATIENT)
Dept: SURGERY | Facility: HOSPITAL | Age: 84
End: 2024-02-16
Payer: MEDICARE

## 2024-02-16 PROCEDURE — 64415 NJX AA&/STRD BRCH PLXS IMG: CPT | Performed by: ORTHOPAEDIC SURGERY

## 2024-02-16 PROCEDURE — 73030 X-RAY EXAM OF SHOULDER: CPT | Performed by: ORTHOPAEDIC SURGERY

## 2024-02-16 PROCEDURE — 0RRJ00Z REPLACEMENT OF RIGHT SHOULDER JOINT WITH REVERSE BALL AND SOCKET SYNTHETIC SUBSTITUTE, OPEN APPROACH: ICD-10-PCS | Performed by: ORTHOPAEDIC SURGERY

## 2024-02-16 PROCEDURE — 94799 UNLISTED PULMONARY SVC/PX: CPT

## 2024-02-16 PROCEDURE — 88311 DECALCIFY TISSUE: CPT | Performed by: ORTHOPAEDIC SURGERY

## 2024-02-16 PROCEDURE — 88305 TISSUE EXAM BY PATHOLOGIST: CPT | Performed by: ORTHOPAEDIC SURGERY

## 2024-02-16 DEVICE — IMPLANTABLE DEVICE: Type: IMPLANTABLE DEVICE | Site: SHOULDER | Status: FUNCTIONAL

## 2024-02-16 DEVICE — SCREW BNE L40MM DIA6.5MM CNTRL THRD GLEN REV: Type: IMPLANTABLE DEVICE | Site: SHOULDER | Status: FUNCTIONAL

## 2024-02-16 RX ORDER — LIDOCAINE HYDROCHLORIDE 10 MG/ML
INJECTION, SOLUTION EPIDURAL; INFILTRATION; INTRACAUDAL; PERINEURAL AS NEEDED
Status: DISCONTINUED | OUTPATIENT
Start: 2024-02-16 | End: 2024-02-16 | Stop reason: SURG

## 2024-02-16 RX ORDER — ONDANSETRON 2 MG/ML
4 INJECTION INTRAMUSCULAR; INTRAVENOUS EVERY 6 HOURS PRN
Status: DISCONTINUED | OUTPATIENT
Start: 2024-02-16 | End: 2024-02-16 | Stop reason: HOSPADM

## 2024-02-16 RX ORDER — HYDROMORPHONE HYDROCHLORIDE 1 MG/ML
0.6 INJECTION, SOLUTION INTRAMUSCULAR; INTRAVENOUS; SUBCUTANEOUS EVERY 5 MIN PRN
Status: DISCONTINUED | OUTPATIENT
Start: 2024-02-16 | End: 2024-02-16 | Stop reason: HOSPADM

## 2024-02-16 RX ORDER — ONDANSETRON 2 MG/ML
INJECTION INTRAMUSCULAR; INTRAVENOUS AS NEEDED
Status: DISCONTINUED | OUTPATIENT
Start: 2024-02-16 | End: 2024-02-16 | Stop reason: SURG

## 2024-02-16 RX ORDER — ACETAMINOPHEN 500 MG
1000 TABLET ORAL ONCE
Status: DISCONTINUED | OUTPATIENT
Start: 2024-02-16 | End: 2024-02-16 | Stop reason: HOSPADM

## 2024-02-16 RX ORDER — HYDROMORPHONE HYDROCHLORIDE 1 MG/ML
0.2 INJECTION, SOLUTION INTRAMUSCULAR; INTRAVENOUS; SUBCUTANEOUS EVERY 5 MIN PRN
Status: DISCONTINUED | OUTPATIENT
Start: 2024-02-16 | End: 2024-02-16 | Stop reason: HOSPADM

## 2024-02-16 RX ORDER — DEXAMETHASONE SODIUM PHOSPHATE 10 MG/ML
INJECTION, SOLUTION INTRAMUSCULAR; INTRAVENOUS AS NEEDED
Status: DISCONTINUED | OUTPATIENT
Start: 2024-02-16 | End: 2024-02-16 | Stop reason: SURG

## 2024-02-16 RX ORDER — MORPHINE SULFATE 4 MG/ML
2 INJECTION, SOLUTION INTRAMUSCULAR; INTRAVENOUS EVERY 10 MIN PRN
Status: DISCONTINUED | OUTPATIENT
Start: 2024-02-16 | End: 2024-02-16 | Stop reason: HOSPADM

## 2024-02-16 RX ORDER — CARVEDILOL 12.5 MG/1
12.5 TABLET ORAL 2 TIMES DAILY WITH MEALS
Status: DISCONTINUED | OUTPATIENT
Start: 2024-02-16 | End: 2024-02-17

## 2024-02-16 RX ORDER — SENNOSIDES 8.6 MG
17.2 TABLET ORAL NIGHTLY
Status: DISCONTINUED | OUTPATIENT
Start: 2024-02-16 | End: 2024-02-17

## 2024-02-16 RX ORDER — MORPHINE SULFATE 4 MG/ML
4 INJECTION, SOLUTION INTRAMUSCULAR; INTRAVENOUS EVERY 10 MIN PRN
Status: DISCONTINUED | OUTPATIENT
Start: 2024-02-16 | End: 2024-02-16 | Stop reason: HOSPADM

## 2024-02-16 RX ORDER — METOCLOPRAMIDE HYDROCHLORIDE 5 MG/ML
10 INJECTION INTRAMUSCULAR; INTRAVENOUS EVERY 8 HOURS PRN
Status: DISCONTINUED | OUTPATIENT
Start: 2024-02-16 | End: 2024-02-16 | Stop reason: HOSPADM

## 2024-02-16 RX ORDER — DEXAMETHASONE SODIUM PHOSPHATE 4 MG/ML
VIAL (ML) INJECTION AS NEEDED
Status: DISCONTINUED | OUTPATIENT
Start: 2024-02-16 | End: 2024-02-16 | Stop reason: SURG

## 2024-02-16 RX ORDER — NALOXONE HYDROCHLORIDE 0.4 MG/ML
80 INJECTION, SOLUTION INTRAMUSCULAR; INTRAVENOUS; SUBCUTANEOUS AS NEEDED
Status: DISCONTINUED | OUTPATIENT
Start: 2024-02-16 | End: 2024-02-16 | Stop reason: HOSPADM

## 2024-02-16 RX ORDER — HYDROMORPHONE HYDROCHLORIDE 1 MG/ML
0.4 INJECTION, SOLUTION INTRAMUSCULAR; INTRAVENOUS; SUBCUTANEOUS EVERY 5 MIN PRN
Status: DISCONTINUED | OUTPATIENT
Start: 2024-02-16 | End: 2024-02-16 | Stop reason: HOSPADM

## 2024-02-16 RX ORDER — SODIUM CHLORIDE, SODIUM LACTATE, POTASSIUM CHLORIDE, CALCIUM CHLORIDE 600; 310; 30; 20 MG/100ML; MG/100ML; MG/100ML; MG/100ML
INJECTION, SOLUTION INTRAVENOUS CONTINUOUS
Status: DISCONTINUED | OUTPATIENT
Start: 2024-02-16 | End: 2024-02-17

## 2024-02-16 RX ORDER — ROCURONIUM BROMIDE 10 MG/ML
INJECTION, SOLUTION INTRAVENOUS AS NEEDED
Status: DISCONTINUED | OUTPATIENT
Start: 2024-02-16 | End: 2024-02-16 | Stop reason: SURG

## 2024-02-16 RX ORDER — MULTIVITAMIN/IRON/FOLIC ACID 18MG-0.4MG
500 TABLET ORAL EVERY MORNING
Status: DISCONTINUED | OUTPATIENT
Start: 2024-02-16 | End: 2024-02-17

## 2024-02-16 RX ORDER — ROPIVACAINE HYDROCHLORIDE 5 MG/ML
INJECTION, SOLUTION EPIDURAL; INFILTRATION; PERINEURAL AS NEEDED
Status: DISCONTINUED | OUTPATIENT
Start: 2024-02-16 | End: 2024-02-16 | Stop reason: SURG

## 2024-02-16 RX ORDER — CARBOXYMETHYLCELLULOSE SODIUM 10 MG/ML
1 GEL OPHTHALMIC AS NEEDED
Status: DISCONTINUED | OUTPATIENT
Start: 2024-02-16 | End: 2024-02-17

## 2024-02-16 RX ORDER — MORPHINE SULFATE 10 MG/ML
6 INJECTION, SOLUTION INTRAMUSCULAR; INTRAVENOUS EVERY 10 MIN PRN
Status: DISCONTINUED | OUTPATIENT
Start: 2024-02-16 | End: 2024-02-16 | Stop reason: HOSPADM

## 2024-02-16 RX ORDER — MAGNESIUM HYDROXIDE 1200 MG/15ML
LIQUID ORAL CONTINUOUS PRN
Status: COMPLETED | OUTPATIENT
Start: 2024-02-16 | End: 2024-02-16

## 2024-02-16 RX ADMIN — DEXAMETHASONE SODIUM PHOSPHATE 4 MG: 10 INJECTION, SOLUTION INTRAMUSCULAR; INTRAVENOUS at 09:21:00

## 2024-02-16 RX ADMIN — LIDOCAINE HYDROCHLORIDE 50 MG: 10 INJECTION, SOLUTION EPIDURAL; INFILTRATION; INTRACAUDAL; PERINEURAL at 09:36:00

## 2024-02-16 RX ADMIN — ROPIVACAINE HYDROCHLORIDE 30 ML: 5 INJECTION, SOLUTION EPIDURAL; INFILTRATION; PERINEURAL at 09:21:00

## 2024-02-16 RX ADMIN — ROCURONIUM BROMIDE 30 MG: 10 INJECTION, SOLUTION INTRAVENOUS at 09:36:00

## 2024-02-16 RX ADMIN — ROCURONIUM BROMIDE 20 MG: 10 INJECTION, SOLUTION INTRAVENOUS at 09:56:00

## 2024-02-16 RX ADMIN — SODIUM CHLORIDE, SODIUM LACTATE, POTASSIUM CHLORIDE, CALCIUM CHLORIDE: 600; 310; 30; 20 INJECTION, SOLUTION INTRAVENOUS at 09:32:00

## 2024-02-16 RX ADMIN — ROCURONIUM BROMIDE 10 MG: 10 INJECTION, SOLUTION INTRAVENOUS at 10:39:00

## 2024-02-16 RX ADMIN — DEXAMETHASONE SODIUM PHOSPHATE 4 MG: 4 MG/ML VIAL (ML) INJECTION at 09:56:00

## 2024-02-16 RX ADMIN — ONDANSETRON 4 MG: 2 INJECTION INTRAMUSCULAR; INTRAVENOUS at 09:56:00

## 2024-02-16 RX ADMIN — LIDOCAINE HYDROCHLORIDE 2 ML: 10 INJECTION, SOLUTION EPIDURAL; INFILTRATION; INTRACAUDAL; PERINEURAL at 09:18:00

## 2024-02-16 RX ADMIN — SODIUM CHLORIDE, SODIUM LACTATE, POTASSIUM CHLORIDE, CALCIUM CHLORIDE: 600; 310; 30; 20 INJECTION, SOLUTION INTRAVENOUS at 11:07:00

## 2024-02-16 NOTE — BRIEF OP NOTE
Pre-Operative Diagnosis: Anterior dislocation of right shoulder, initial encounter, acute pain of right shoulder, glenoid fracture of shoulder, right, closed initial encounter     Post-Operative Diagnosis: Anterior dislocation of right shoulder, initial encounter, acute pain of right shoulder, glenoid fracture of shoulder, right, closed initial encounter      Procedure Performed:   Right reverse total shoulder arthroplasty    Surgeon(s) and Role:     * Gabriella Murry MD - Primary    Assistant(s):  Surgical Assistant.: Stephan Abraham     Surgical Findings: rotator cuff arthropathy with glenoid fracture and instability     Specimen: humeral head     Estimated Blood Loss: 150cc    Dictation Number:  #[id number]    Gabriella Murry MD  2/16/2024  11:52 AM

## 2024-02-16 NOTE — H&P
Dulce SHEIKH Maria Isabel  6/27/1940  83 year old   female  Gabriella Murry MD     HPI:   Patient presents with:  Right Shoulder - Pain, Dislocation, Fracture        The patient is ambidextrous.  Date of injury/ onset of symptoms: 1/20/24 Patient tripped and fell onto her right arm while at home.  The patient complains of pain located in her right shoulder.  The pain is graded as severe.  The patient's pain occurs with use.  The patient denies numbness and tingling.  The patient denies skin laceration or breakdown.  Prior treatments have included reduction, sling and ice.     ALLERGIES:     Asa [Aspirin]           OTHER (SEE COMMENTS)    Comment:All NSAIDS  Bee                     SWELLING  Beck Rdz Daytime [Me*    OTHER (SEE COMMENTS)    Comment:Rash , itching, chills n/v  Bisacodyl               OTHER (SEE COMMENTS)    Comment:Caused insides to burn  Cephalexin              ANAPHYLAXIS  Chlorine                OTHER (SEE COMMENTS)    Comment:HA rash, itching  Ciprofloxacin           ANAPHYLAXIS  Clindamycin Hcl         ANAPHYLAXIS    Comment:Gave heart palp, chest pain , HA  Codeine [Morphine A*    OTHER (SEE COMMENTS)    Comment:Rash itching HA chills  Crabs (Crustaceans)     ANAPHYLAXIS    Comment:Throat swelling  Cranberry               ANAPHYLAXIS    Comment:Watermelon causes throat closing  Darvon [Propoxyphen*    OTHER (SEE COMMENTS)    Comment:Chills, fever, HA itching  Doxycycline             OTHER (SEE COMMENTS)    Comment:Caused her to have muscle aches/pains , unable to             move hands/ feet , caused memory issues  Elavil [Amitriptyli*    OTHER (SEE COMMENTS)    Comment:Worse symptms of RSD  Entex                   ANAPHYLAXIS    Comment:Same as Elivail  Entex La                OTHER (SEE COMMENTS)    Comment:Passed out, N/V , diarrhea  Erythromycin            OTHER (SEE COMMENTS)    Comment:Passing out / HTN  Went to ER  Ibuprofen               OTHER (SEE COMMENTS)    Comment:Allergic to ALL NSAIDS,  caused bleeding, chills             rash  Iodoform                OTHER (SEE COMMENTS)    Comment:Iodine caused buring, bleeding  Lovenox [Enoxaparin]    OTHER (SEE COMMENTS)    Comment:Caused bruising/ bleeding  Neurontin [Gabapent*    OTHER (SEE COMMENTS)    Comment:Caused worse symptoms of RSD  Norflex [Orphenadri*    OTHER (SEE COMMENTS)    Comment:Caused worse symptoms of RSD  Other                   ANAPHYLAXIS    Comment:HYDROCHLORIC ACID/HCL, neoprene caused cellultis ,             ARTIFICAL SWEETNERS  Cause n/v diarrhea Cant use             eye drops  Penicillins             ANAPHYLAXIS  Prednisolone            SWELLING    Comment:   EYE DROPS : Swelling of face, High BP, burning             sensation on eyes  Prilosec [Omeprazol*    ANAPHYLAXIS    Comment:Cardiac issues  Sulfa Antibiotics       ANAPHYLAXIS  Tetracycline [Tetra*    ANAPHYLAXIS    Comment:Doxycycline caused RSD / fibromyalgia  Triamcinolone           OTHER (SEE COMMENTS)    Comment:caused cellultits all over  Tylenol [Acetaminop*    ANAPHYLAXIS    Comment:Fainting had to go to ER  Ultram [Tramadol Hc*    OTHER (SEE COMMENTS)    Comment:Chills, rash itching HA, caused worse RSD symptoms  Valium                  OTHER (SEE COMMENTS)    Comment:HA, rash , chills, itching  Alcohol                 RASH, NAUSEA AND VOMITING  Dust                    OTHER (SEE COMMENTS)    Comment:headache  Fish Oil                OTHER (SEE COMMENTS)    Comment:Vitamin e caused chills, nv/ HA ,  Kaolin                  OTHER (SEE COMMENTS)    Comment:N/v , diarrhea  Mold                    OTHER (SEE COMMENTS)    Comment:Caused rash  Chill, Ha itching  Ofloxacin               OTHER (SEE COMMENTS)    Comment:Severe allergy to fluoroquinolones  Chlorpheniramine        NAUSEA ONLY  Diphenhydramine         RASH    Comment:cream               Current Outpatient Medications   Medication Sig Dispense Refill    Homeopathic Products (COLDCALM SL) Place under the tongue.         MULTI-VITAMIN OR None Entered        CALCIUM + D OR None Entered        MAGNESIUM OR None Entered        VITAMIN C OR None Entered          HISTORY:          Past Medical History:   Diagnosis Date    Esophageal reflux       gets dilated     Fibromyalgia      Pneumonia due to organism       age 4. and in 1996     PONV (postoperative nausea and vomiting)      RSD (reflex sympathetic dystrophy)                  Past Surgical History:   Procedure Laterality Date    ANKLE FRACTURE SURGERY         Rt X 2    CATARACT   2022    OTHER SURGICAL HISTORY         Rt 3rd finger after tip was amputated    OTHER SURGICAL HISTORY         cysts removed pilondial     UPPER GI ENDOSCOPY,EXAM                    Family History   Problem Relation Age of Onset    Heart Attack Father      Breast Cancer Mother      Breast Cancer Sister      Heart Attack Brother      Diabetes Sister        Social History    Tobacco Use      Smoking status: Never      Smokeless tobacco: Never    Vaping Use      Vaping Use: Never used    Alcohol use: No    Drug use: No            REVIEW OF SYSTEMS:   A 12 point review of systems was performed as documented on the intake form and reviewed by me today with pertinent positives and negatives listed in the HPI.     EXAM:   Ht 5' 2\" (1.575 m)   Wt 107 lb 8 oz (48.8 kg)   BMI 19.66 kg/m²   The patient is awake and oriented x 3 and overall well appearing.  The patient has normal mood.  The patient is non-tender and atraumatic with the exception of their right upper extremity.  The patient's skin is intact and compartments are soft.  The patient's upper extremities are warm and pink with brisk capillary refill and 2+ radial pulses.  Sensation is intact to light touch in axillary, median, ulnar, and radial nerve distributions.  The patient has 5/5 strength in finger flexion, finger extension, EPL, FPL, and interosseous muscle function.  The patient has tenderness to palpation at the right shoulder with associated  edema and ecchymosis.  There is no evidence of clinical deformity.  She has elevation to 90 degrees, external rotation to 40 degrees, and internal rotation to L5. She has crepitus. She has increased anterior translation.  The patient has 5/5 strength in elevation, external rotation, and internal rotation.        IMAGING AND TESTING:  Xrays and CT of her right shoulder independently reviewed by me today reveals a comminuted glenoid fracture with anterior inferior subluxation of her humeral head        ASSESSMENT AND PLAN:   Anterior dislocation of right shoulder, initial encounter  (primary encounter diagnosis)  Acute pain of right shoulder  Glenoid fracture of shoulder, right, closed, initial encounter     The risks, indications, benefits, and procedures of both operative and non-operative treatment were discussed with the patient.     The patient desired surgery.  Surgery recommended was right reverse total shoulder arthroplasty.  Risks include bleeding, infection, neurovascular injury, failure of the procedure, stiffness, and potential need for additional surgery as well as anesthetic complications including death.  Risks of fracture care include malunion, nonunion, delayed union, and post-traumatic arthritis. The patient consented to the procedure.   She will rest in an ultrasling that she will also use after surgery     All of their questions were answered and they are in agreement with the treatment plan.     The patient will return to clinic in 2 weeks postop for further clinical and radiographic evaluation with right shoulder Grashey, outlet, and Valpeau views.

## 2024-02-16 NOTE — ANESTHESIA PROCEDURE NOTES
Airway  Date/Time: 2/16/2024 9:39 AM  Urgency: Elective      General Information and Staff    Patient location during procedure: OR  Anesthesiologist: Jhonatan Escobar MD  Resident/CRNA: Dodie Hurt CRNA  Performed: CRNA   Performed by: Dodie Hurt CRNA  Authorized by: Jhonatan Escobar MD      Indications and Patient Condition  Indications for airway management: anesthesia  Sedation level: deep  Preoxygenated: yes  Patient position: sniffing  Mask difficulty assessment: 1 - vent by mask    Final Airway Details  Final airway type: endotracheal airway      Successful airway: ETT  Cuffed: yes   Successful intubation technique: Video laryngoscopy  Endotracheal tube insertion site: oral  Blade: Rodrigo  Blade size: #3  ETT size (mm): 7.0    Cormack-Lehane Classification: grade I - full view of glottis  Placement verified by: capnometry   Cuff volume (mL): 8  Measured from: lips  ETT to lips (cm): 20  Number of attempts at approach: 1  Number of other approaches attempted: 0

## 2024-02-16 NOTE — ANESTHESIA POSTPROCEDURE EVALUATION
Patient: Dulce Nicolas    Procedure Summary       Date: 02/16/24 Room / Location: [facility] MAIN OR 06 / [facility] MAIN OR    Anesthesia Start: 0931 Anesthesia Stop:     Procedure: Right reverse total shoulder arthroplasty (Right: Shoulder) Diagnosis: (Anterior dislocation of right shoulder, initial encounter, acute pain of right shoulder, glenoid fracture of shoulder, right, closed initial encounter)    Surgeons: Gabriella Murry MD Anesthesiologist: Jhonatan Escobar MD    Anesthesia Type: general ASA Status: 2            Anesthesia Type: general    Vitals Value Taken Time   /75 02/16/24 1125   Temp 97.3 °F (36.3 °C) 02/16/24 1125   Pulse 68 02/16/24 1125   Resp 16 02/16/24 1125   SpO2 94 % 02/16/24 1125   Vitals shown include unfiled device data.    [facility] AN Post Evaluation:   Patient Evaluated in PACU  Patient Participation: complete - patient participated  Level of Consciousness: sleepy but conscious  Pain Score: 0  Pain Management: adequate  Airway Patency:patent  Dental exam unchanged from preop  Yes    Cardiovascular Status: stable and acceptable  Respiratory Status: acceptable and room air  Postoperative Hydration acceptable      ALVINA LOPEZ CRNA  2/16/2024 11:26 AM

## 2024-02-16 NOTE — H&P
[facility] HISTORY AND PHYSICAL     CC: post op medical management       PCP: Brent Mccall MD    History of Present Illness:   Patient is a 83 year old female with PMH sig for cataracts, GERD, fibromyalgia, reflex sympathetic dystrophy, here sp R shoulder reverse TSA. She has impaired vision after cataract surgeries due to complications and was moving closer to her TV to watch- her foot caught on something and she tripped and fell- landing on her L knee and R UE. Her LLE workup negative but found w L shoulder fx/ dislocation. Surgery was attempted earlier but cancelled due to high BP (did not previously carry a diagnosis of HTN)- saw a new PCP (her PCP palomaring, elizabeth to get her in) who started her on coreg. Her chronic medical problems of fibromyalgia and RSD are managed on supplements including magnesium, calcium MVI/ vit C. She currently reports feeling chills from an ice pack in her armpit, pain/disomfort from the SCDs and numbness at surgical site/ RUE.       Past Medical History:   Diagnosis Date    Cataract     Esophageal reflux     gets dilated     Fibromyalgia     Hearing impairment     bilateral hearing aids    High blood pressure     Pneumonia due to organism     age 4. and in 1996     PONV (postoperative nausea and vomiting)     RSD (reflex sympathetic dystrophy) 1996    Visual impairment     eyeglasses, bilateral cornea transplant      Past Surgical History:   Procedure Laterality Date    ANKLE FRACTURE SURGERY      Rt X 2    CATARACT      Corneal Transplants    EYE SURGERY Bilateral     corneal transplant    OTHER SURGICAL HISTORY      Rt 3rd finger after tip was amputated    OTHER SURGICAL HISTORY      cysts removed pilondial     UPPER GI ENDOSCOPY,EXAM          ALL:  Allergies   Allergen Reactions    Asa [Aspirin] OTHER (SEE COMMENTS)     All NSAIDS     Bee SWELLING    Beck Rdz Daytime [Menthol] OTHER (SEE COMMENTS)     Rash , itching, chills n/v    Chlorine OTHER (SEE COMMENTS)     HA rash,  itching     Ciprofloxacin ANAPHYLAXIS    Clindamycin Hcl ANAPHYLAXIS     Gave heart palp, chest pain , HA     Codeine [Opioid Analgesics] OTHER (SEE COMMENTS)     Rash itching HA chills    Crabs (Crustaceans) ANAPHYLAXIS     Throat swelling    Cranberry ANAPHYLAXIS     Watermelon causes throat closing     Darvon [Propoxyphene] OTHER (SEE COMMENTS)     Chills, fever, HA itching     Doxycycline OTHER (SEE COMMENTS)     Caused her to have muscle aches/pains , unable to move hands/ feet , caused memory issues     Dulcolax OTHER (SEE COMMENTS)     Caused insides to burn     Elavil [Amitriptyline Hcl] OTHER (SEE COMMENTS)     Worse symptms of RSD     Entex ANAPHYLAXIS     Same as Elivail     Entex La OTHER (SEE COMMENTS)     Passed out, N/V , diarrhea     Erythromycin OTHER (SEE COMMENTS)     Passing out / HTN  Went to ER     Ibuprofen OTHER (SEE COMMENTS)     Allergic to ALL NSAIDS, caused bleeding, chills rash     Iodoform OTHER (SEE COMMENTS)     Iodine caused burning, bleeding     Keflex ANAPHYLAXIS    Lovenox [Enoxaparin] OTHER (SEE COMMENTS)     Caused bruising/ bleeding     Neurontin [Gabapentin] OTHER (SEE COMMENTS)     Caused worse symptoms of RSD     Norflex [Orphenadrine Citrate Cr] OTHER (SEE COMMENTS)     Caused worse symptoms of RSD     Other ANAPHYLAXIS     HYDROCHLORIC ACID/HCL, neoprene caused cellultis , ARTIFICAL SWEETNERS  Cause n/v diarrhea   Cant use eye drops     Penicillins ANAPHYLAXIS    Prednisolone OTHER (SEE COMMENTS)     Eye gtts - increased BP    Prilosec [Omeprazole Magnesium] ANAPHYLAXIS     Cardiac issues     Silicone SWELLING     Cellulitis    Sulfa Antibiotics ANAPHYLAXIS    Tetracycline [Tetracycline Hcl] ANAPHYLAXIS     Doxycycline caused RSD / fibromyalgia     Triamcinolone SWELLING and OTHER (SEE COMMENTS)     caused cellultits all over     Tylenol [Acetaminophen] ANAPHYLAXIS     Fainting had to go to ER     Ultram [Tramadol Hcl] OTHER (SEE COMMENTS)     Chills, rash itching HA,  caused worse RSD symptoms     Alcohol RASH and NAUSEA AND VOMITING    Fish Oil OTHER (SEE COMMENTS)     Vitamin e caused chills, nv/ HA ,     Kaolin OTHER (SEE COMMENTS)     N/v , diarrhea     Dust     Loteprednol OTHER (SEE COMMENTS)     High blood pressure, double vision    Mold OTHER (SEE COMMENTS)     Caused rash  Chill, Kennedy itching     Valium OTHER (SEE COMMENTS)     HA, rash , chills, itching     Chlorpheniramine NAUSEA ONLY    Diphenhydramine RASH     cream    Tea Tree Oil ITCHING             Social History     Tobacco Use    Smoking status: Never    Smokeless tobacco: Never   Substance Use Topics    Alcohol use: No        Fam Hx  Family History   Problem Relation Age of Onset    Heart Attack Father     Breast Cancer Mother     Breast Cancer Sister     Diabetes Sister     Heart Attack Brother        Review of Systems  10 point Comprehensive ROS reviewed and negative except for what's stated above.     OBJECTIVE:  BP (!) 168/85 (BP Location: Left arm)   Pulse 73   Temp 97.3 °F (36.3 °C)   Resp 21   Ht 5' 2\" (1.575 m)   Wt 99 lb (44.9 kg)   SpO2 97%   BMI 18.11 kg/m²     GEN: NAD, AAO  NECK: supple, no LAD  HEENT- sclera anti-icteric, OP- MMM  CV: rrr, +s1/s2, PMI non displaced  LUNGS: CTAB, normal resp effort  ABL soft, NT/ND, NABS, no HSC  EXT: RUE in sling + cap refill + distal pulses  Neuro: sensation intact, no focal deficits  SKIN- no rashes, no lesion  PSYCH- normal mentation, anxious affect      LABS:        Radiology: XR SHOULDER, COMPLETE (MIN 2 VIEWS), RIGHT (CPT=73030)    Result Date: 2/16/2024  PROCEDURE: XR SHOULDER, COMPLETE (MIN 2 VIEWS), RIGHT (CPT=73030)  COMPARISON: None.  INDICATIONS: History of anterior shoulder dislocation with glenoid fracture.  Rotator cuff arthropathy and glenoid instability  Postop reverse TSA.  TECHNIQUE: 4 views were obtained.   FINDINGS:  BONES: Right shoulder total reverse prosthesis in anatomic position.  No adverse features. No acute fracture or  dislocation.  Hypertrophic degeneration right AC joint SOFT TISSUES: Negative. No visible soft tissue swelling. EFFUSION: None visible. OTHER: Negative.         CONCLUSION:  1. Right shoulder total reverse prosthesis in anatomic position.  No adverse features. 2. No acute fracture or dislocation.  Mild hypertrophic degeneration right AC joint    Dictated by (CST): Marcelo Torres MD on 2/16/2024 at 12:37 PM     Finalized by (CST): Marcelo Torres MD on 2/16/2024 at 12:40 PM               ASSESSMENT / PLAN:      R shoulder dislocation/ fracture sp R reverse TSA 2/16/24 w Dr Murry  Post op pain  GERD  Fibromyalgia  Reflex symphathetic dystrophy  Hard of hearing  Elevated BP/ white coat HTN    Post op  - per ortho  - PT, OT- she will stay w sister at dc  - pain control: both IVP morphine / IVP dilaudid ordered > PO as able  - labs as needed    Elevated BP  - started on coreg after initial scheduled 2/2/24 surgery cancelled for high BP  - resumed med w hold parameters for SBP < 110mmhg or HR < 60- reports she does get pressures as low as 60 systolic at home on this new med  - consider dose reduction of coreg at dc will need f/u with new pcp    Fibromyalgia/ RSD  - resumed magnesium home med, holding mvi/ vit c/ calcium supplementation    GERD  - not on med    Est dc date 2/17/24    Outpatient records or previous hospital records reviewed as detailed above.    YAJAIRA hospitalist to continue to follow patient while in house      YAJAIRA Flores Hospitalist  Pager [phone]    2/16/2024  12:43 PM

## 2024-02-16 NOTE — ANESTHESIA PROCEDURE NOTES
Peripheral Block    Date/Time: 2/16/2024 9:18 AM    Performed by: Jhonatan Escobar MD  Authorized by: Jhonatan Escobar MD      General Information and Staff    Start Time:  2/16/2024 9:18 AM  End Time:  2/16/2024 9:21 AM  Anesthesiologist:  Jhonatan Escobar MD  Performed by:  Anesthesiologist  Patient Location:  PACU    Block Placement: Pre Induction  Site Identification: real time ultrasound guided, nerve stimulator and image stored and retrievable    Block site/laterality marked before start: site marked  Reason for Block: at surgeon's request and post-op pain management    Preanesthetic Checklist: 2 patient identifers, IV checked, site marked, risks and benefits discussed, monitors and equipment checked, pre-op evaluation, timeout performed, anesthesia consent, sterile technique used, no prohibitive neurological deficits and no local skin infection at insertion site      Procedure Details    Patient Position:  Sitting  Prep: ChloraPrep and patient draped    Monitoring:  Cardiac monitor, continuous pulse ox, blood pressure cuff and heart rate  Block Type:  Interscalene  Laterality:  Right  Injection Technique:  Single-shot    Needle    Needle Type:  Short-bevel  Needle Gauge:  22 G  Needle Length:  50 mm  Needle Localization:  Ultrasound guidance and nerve stimulator  Reason for Ultrasound Use: appropriate spread of the medication was noted in real time and no ultrasound evidence of intravascular and/or intraneural injection    Nerve Stimulator: 0.6 amps  Muscle Twitch Response: flexor carpi radialis response      Assessment    Injection Assessment:  Good spread noted, incremental injection, local visualized surrounding nerve on ultrasound, low pressure, negative aspiration for heme, no pain on injection and negative resistance  Paresthesia Pain:  None  Heart Rate Change: No    - Patient tolerated block procedure well without evidence of immediate block related complications.     Medications  2/16/2024  9:18 AM      Additional Comments

## 2024-02-16 NOTE — ANESTHESIA PREPROCEDURE EVALUATION
Anesthesia PreOp Note    HPI:     Dulce Nicolas is a 83 year old female who presents for preoperative consultation requested by: Gabriella Murry MD    Date of Surgery: 2/16/2024    Procedure(s):  Right reverse total shoulder arthroplasty  Indication: Anterior dislocation of right shoulder, initial encounter, acute pain of right shoulder, glenoid fracture of shoulder, right, closed initial encounter    Relevant Problems   No relevant active problems       NPO:  Last Liquid Consumption Date: 02/16/24  Last Liquid Consumption Time: 0508  Last Solid Consumption Date: 02/15/24  Last Solid Consumption Time: 1800  Last Liquid Consumption Date: 02/16/24          History Review:  Patient Active Problem List    Diagnosis Date Noted    Vaccination reaction 08/16/2023    Fatigue after COVID-19 vaccination 08/16/2023    Histroy of left cataract surgery 2-10-22 08/15/2022    Complex regional pain syndrome type 1 of both upper extremities 08/12/2021    Bilateral bunions 08/06/2018    Fibromyalgia 08/01/2016    Reflex sympathetic dystrophy of both lower extremities 08/01/2016    Varicose veins of both lower extremities 08/01/2016    Primary localized osteoarthrosis, hand 01/15/2015    Contusion of right hand 01/15/2015    Sprain of left thumb 01/15/2015    Residual foreign body in soft tissue 06/20/2013       Past Medical History:   Diagnosis Date    Cataract     Esophageal reflux     gets dilated     Fibromyalgia     Hearing impairment     bilateral hearing aids    High blood pressure     Pneumonia due to organism     age 4. and in 1996     PONV (postoperative nausea and vomiting)     RSD (reflex sympathetic dystrophy) 1996    Visual impairment     eyeglasses, bilateral cornea transplant       Past Surgical History:   Procedure Laterality Date    ANKLE FRACTURE SURGERY      Rt X 2    CATARACT      Corneal Transplants    EYE SURGERY Bilateral     corneal transplant    OTHER SURGICAL HISTORY      Rt 3rd finger after tip was  amputated    OTHER SURGICAL HISTORY      cysts removed pilondial     UPPER GI ENDOSCOPY,EXAM         Medications Prior to Admission   Medication Sig Dispense Refill Last Dose    carvedilol 12.5 MG Oral Tab Take 1 tablet (12.5 mg total) by mouth 2 (two) times daily with meals. Takes medication after lunch and after dinner   2/16/2024 at 0508    ARTIFICIAL TEAR SOLUTION OP Place 1 drop into both eyes 3 (three) times daily.   2/12/2024    HONEY OR Take by mouth. 1 tsp daily   2/12/2024    Homeopathic Products (COLDCALM SL) Place under the tongue daily as needed.   2/12/2024    MULTI-VITAMIN OR Take by mouth every morning.   2/12/2024    CALCIUM + D OR Take 600 mg by mouth daily with dinner.   2/12/2024    MAGNESIUM OR Take 500 mg by mouth every morning.   2/12/2024    VITAMIN C OR Take 500 mg by mouth at bedtime.   2/12/2024     Current Facility-Administered Medications Ordered in Epic   Medication Dose Route Frequency Provider Last Rate Last Admin    lactated ringers infusion   Intravenous Continuous Gabriella Murry MD 20 mL/hr at 02/16/24 0848 New Bag at 02/16/24 0848    acetaminophen (Tylenol Extra Strength) tab 1,000 mg  1,000 mg Oral Once Gabriella Murry MD        metoprolol tartrate (Lopressor) tab 25 mg  25 mg Oral Once PRN Gabriella Murry MD        vancomycin (Vancocin) 750 mg in sodium chloride 0.9% 250 mL IVPB-ADDV  15 mg/kg Intravenous Once Gabriella Murry  mL/hr at 02/16/24 0849 750 mg at 02/16/24 0849    And    gentamicin (Garamycin) 240 mg in sodium chloride 0.9% 100 mL IVPB  5 mg/kg Intravenous Once Gabriella Murry MD         No current [facility]-ordered outpatient medications on file.       Allergies   Allergen Reactions    Asa [Aspirin] OTHER (SEE COMMENTS)     All NSAIDS     Bee SWELLING    Beck Rdz Daytime [Menthol] OTHER (SEE COMMENTS)     Rash , itching, chills n/v    Chlorine OTHER (SEE COMMENTS)     HA rash, itching     Ciprofloxacin ANAPHYLAXIS    Clindamycin Hcl ANAPHYLAXIS     Gave  heart palp, chest pain , HA     Codeine [Opioid Analgesics] OTHER (SEE COMMENTS)     Rash itching HA chills    Crabs (Crustaceans) ANAPHYLAXIS     Throat swelling    Cranberry ANAPHYLAXIS     Watermelon causes throat closing     Darvon [Propoxyphene] OTHER (SEE COMMENTS)     Chills, fever, HA itching     Doxycycline OTHER (SEE COMMENTS)     Caused her to have muscle aches/pains , unable to move hands/ feet , caused memory issues     Dulcolax OTHER (SEE COMMENTS)     Caused insides to burn     Elavil [Amitriptyline Hcl] OTHER (SEE COMMENTS)     Worse symptms of RSD     Entex ANAPHYLAXIS     Same as Elivail     Entex La OTHER (SEE COMMENTS)     Passed out, N/V , diarrhea     Erythromycin OTHER (SEE COMMENTS)     Passing out / HTN  Went to ER     Ibuprofen OTHER (SEE COMMENTS)     Allergic to ALL NSAIDS, caused bleeding, chills rash     Iodoform OTHER (SEE COMMENTS)     Iodine caused burning, bleeding     Keflex ANAPHYLAXIS    Lovenox [Enoxaparin] OTHER (SEE COMMENTS)     Caused bruising/ bleeding     Neurontin [Gabapentin] OTHER (SEE COMMENTS)     Caused worse symptoms of RSD     Norflex [Orphenadrine Citrate Cr] OTHER (SEE COMMENTS)     Caused worse symptoms of RSD     Other ANAPHYLAXIS     HYDROCHLORIC ACID/HCL, neoprene caused cellultis , ARTIFICAL SWEETNERS  Cause n/v diarrhea   Cant use eye drops     Penicillins ANAPHYLAXIS    Prednisolone OTHER (SEE COMMENTS)     Eye gtts - increased BP    Prilosec [Omeprazole Magnesium] ANAPHYLAXIS     Cardiac issues     Silicone SWELLING     Cellulitis    Sulfa Antibiotics ANAPHYLAXIS    Tetracycline [Tetracycline Hcl] ANAPHYLAXIS     Doxycycline caused RSD / fibromyalgia     Triamcinolone SWELLING and OTHER (SEE COMMENTS)     caused cellultits all over     Tylenol [Acetaminophen] ANAPHYLAXIS     Fainting had to go to ER     Ultram [Tramadol Hcl] OTHER (SEE COMMENTS)     Chills, rash itching HA, caused worse RSD symptoms     Alcohol RASH and NAUSEA AND VOMITING    Fish Oil  OTHER (SEE COMMENTS)     Vitamin e caused chills, nv/ HA ,     Kaolin OTHER (SEE COMMENTS)     N/v , diarrhea     Dust     Loteprednol OTHER (SEE COMMENTS)     High blood pressure, double vision    Mold OTHER (SEE COMMENTS)     Caused rash  Chill, Kennedy itching     Valium OTHER (SEE COMMENTS)     HA, rash , chills, itching     Chlorpheniramine NAUSEA ONLY    Diphenhydramine RASH     cream    Tea Tree Oil ITCHING       Family History   Problem Relation Age of Onset    Heart Attack Father     Breast Cancer Mother     Breast Cancer Sister     Diabetes Sister     Heart Attack Brother      Social History     Socioeconomic History    Marital status:    Tobacco Use    Smoking status: Never    Smokeless tobacco: Never   Vaping Use    Vaping Use: Never used   Substance and Sexual Activity    Alcohol use: No    Drug use: No   Other Topics Concern    Seat Belt Yes       Available pre-op labs reviewed.             Vital Signs:  Body mass index is 18.11 kg/m².   height is 1.575 m (5' 2\") and weight is 44.9 kg (99 lb). Her oral temperature is 98.7 °F (37.1 °C). Her blood pressure is 183/87 (abnormal) and her pulse is 76. Her respiration is 14 and oxygen saturation is 95%.   Vitals:    02/12/24 1322 02/16/24 0823 02/16/24 0900   BP:  (!) 171/86 (!) 183/87   Pulse:  79 76   Resp:  16 14   Temp:  98.7 °F (37.1 °C)    TempSrc:  Oral    SpO2:  98% 95%   Weight: 48.3 kg (106 lb 8 oz) 44.9 kg (99 lb)    Height: 1.575 m (5' 2\")          Anesthesia Evaluation     Patient summary reviewed and Nursing notes reviewed    History of anesthetic complications (PONV)   Airway   Mallampati: I  TM distance: >3 FB  Neck ROM: full  Dental - Dentition appears grossly intact     Pulmonary - negative ROS and normal exam   Cardiovascular - normal exam  Exercise tolerance: good  (+) hypertension    Neuro/Psych    (+)  neuromuscular disease (fibromyalgia, RSD),        GI/Hepatic/Renal    (+) GERD well controlled    Endo/Other - negative ROS   Abdominal   - normal exam                 Anesthesia Plan:   ASA:  2  Plan:   General  Airway:  ETT  Post-op Pain Management: IV analgesics, Oral pain medication and Interscalene block  Informed Consent Plan and Risks Discussed With:  Patient and sibling  Discussed plan with:  CRNA      I have informed Dulce Nicolas and her sister of the nature of the anesthetic plan, benefits, risks including possible dental damage if relevant, major complications, and any alternative forms of anesthetic management.   All of the patient's questions were answered to the best of my ability. The patient desires the anesthetic management as planned.  I have informed Dulce Nicolas  of the risks of regional anesthesia including, but not limited to: failure, toxicity, cardiac arrest, infection, bleeding, and  nerve damage. The patient desires the proposed regional anesthetic as planned.   ARTUR GENTILE MD  2/16/2024 9:23 AM  Present on Admission:  **None**

## 2024-02-16 NOTE — DISCHARGE INSTRUCTIONS
My goal is to provide excellent care and a positive patient experience.  Please help me to share your experience with others by leaving a review for me on [email or web address], [email or web address], and/or Em[employer]ient.  Thank you for your help.    Go to google.com and search Gabriella Murry.  Click on write a review.  Sign in to google.  (You will not receive any spam or ads from this site and your email will not be posted).  Click on the stars to give a star rating and leave a comment.     Go to healthgrades.com and search Gabriella Simperium.  Click on [employer].  Click on the stars to give a star rating and leave a comment.  Enter your email address (You will not receive any spam or ads from this site and your email will not be posted).  Click the box to confirm that you are a patient and post your review.    Go to [email or web address].[email or web address] and search GabriellaSecondbrain.  Click on GabriellaSecondbrain.  Click on the stars to give a star rating and leave a comment.  Enter your email address (You will not receive any spam or ads from this site and your email will not be posted).  Submit your review.        Gabriella Murry MD      Reverse Total Shoulder Arthroplasty    THE OPERATION:  Your operation was done through an incision in front of your shoulder.  Your shoulder was replaced with metal and plastic.  The tendons in your shoulder were repaired as much as possible.    PAIN:  You will be given a prescription for pain medicine.  Have this filled at your local pharmacy or where your insurance plan has made arrangements.  The pills may be taken every four hours for pain if needed.  Do not drive while you are taking the pain medication.    ACTIVITY:  You should rest your shoulder in the sling and try to limit shoulder motion.  You may bend and straighten your fingers, wrist, and elbow as much as you desire.  Do not raise your arm up or away from your body on your own.  It is safe to write and eat with your operated arm as long as there is no pain.  Do  not carry anything heavier than a dinner fork or a pencil with your operated arm.  Do not use your arm to help you get out of a bed or chair.    SLING:  A sling is necessary to support the arm.  Wear the sling as much as possible and always wear it out in public.  When you are home and seated, you may remove the sling and support your arm on a pillow.    ICE:  Apply ice to your shoulder for 1 hour, 4 times a day for 3 days after surgery.  This will help reduce swelling and pain.  After that you may apply ice as much as you like.    WOUNDS:  Your surgery was done through an incision in front of your shoulder.  The stitches will absorb and will not need to be removed.  You may remove the large bandage 2 days after surgery.  The incision may be sore, swell, and develop bruising over the next several days.  This will go away and no special care is needed.      BATHING:  It is safe to take a shower 2 days after surgery after you remove the large bandage.  To bathe, remove the sling and support your arm by your side.  To wash under your armpit, lean over and let the arm fall away from your body.  DO NOT raise your arm!  You may gently wash the incision with regular soap and water.  Do not scrub.  Your hand and forearm skin may be dry and peel due to the strong disinfectant soap we use at the time of surgery.    FOLLOW-UP:  Call the office to make an appointment to see me in about 2 weeks after your surgery.  If your have a temperature over 101ºF, severe pain, or redness in your shoulder; please contact the office.  You may be asked to come back to the office early.     Discharge Instructions: After Your Surgery  You’ve just had surgery. During surgery, you were given medicine called anesthesia to keep you relaxed and free of pain. After surgery, you may have some pain or nausea. This is common. Here are some tips for feeling better and getting well after surgery.   Going home  Your healthcare provider will show you how to  take care of yourself when you go home. They'll also answer your questions. Have an adult family member or friend drive you home. For the first 24 hours after your surgery:   Don't drive or use heavy equipment.  Don't make important decisions or sign legal papers.  Take medicines as directed.  Don't drink alcohol.  Have someone stay with you, if needed. They can watch for problems and help keep you safe.  Be sure to go to all follow-up visits with your healthcare provider. And rest after your surgery for as long as your provider tells you to.   Coping with pain  If you have pain after surgery, pain medicine will help you feel better. Take it as directed, before pain becomes severe. Also, ask your healthcare provider or pharmacist about other ways to control pain. This might be with heat, ice, or relaxation. And follow any other instructions your surgeon or nurse gives you.      Stay on schedule with your medicine.     Tips for taking pain medicine  To get the best relief possible, remember these points:   Pain medicines can upset your stomach. Taking them with a little food may help.  Most pain relievers taken by mouth need at least 20 to 30 minutes to start to work.  Don't wait till your pain becomes severe before you take your medicine. Try to time your medicine so that you can take it before starting an activity. This might be before you get dressed, go for a walk, or sit down for dinner.  Constipation is a common side effect of some pain medicines. Call your healthcare provider before taking any medicines such as laxatives or stool softeners to help ease constipation. Also ask if you should skip any foods. Drinking lots of fluids and eating foods such as fruits and vegetables that are high in fiber can also help. Remember, don't take laxatives unless your surgeon has prescribed them.  Drinking alcohol and taking pain medicine can cause dizziness and slow your breathing. It can even be deadly. Don't drink alcohol  while taking pain medicine.  Pain medicine can make you react more slowly to things. Don't drive or run machinery while taking pain medicine.  Your healthcare provider may tell you to take acetaminophen to help ease your pain. Ask them how much you're supposed to take each day. Acetaminophen or other pain relievers may interact with your prescription medicines or other over-the-counter (OTC) medicines. Some prescription medicines have acetaminophen and other ingredients in them. Using both prescription and OTC acetaminophen for pain can cause you to accidentally overdose. Read the labels on your OTC medicines with care. This will help you to clearly know the list of ingredients, how much to take, and any warnings. It may also help you not take too much acetaminophen. If you have questions or don't understand the information, ask your pharmacist or healthcare provider to explain it to you before you take the OTC medicine.   Managing nausea  Some people have an upset stomach (nausea) after surgery. This is often because of anesthesia, pain, or pain medicine, less movement of food in the stomach, or the stress of surgery. These tips will help you handle nausea and eat healthy foods as you get better. If you were on a special food plan before surgery, ask your healthcare provider if you should follow it while you get better. Check with your provider on how your eating should progress. It may depend on the surgery you had. These general tips may help:   Don't push yourself to eat. Your body will tell you when to eat and how much.  Start off with clear liquids and soup. They're easier to digest.  Next try semi-solid foods as you feel ready. These include mashed potatoes, applesauce, and gelatin.  Slowly move to solid foods. Don’t eat fatty, rich, or spicy foods at first.  Don't force yourself to have 3 large meals a day. Instead eat smaller amounts more often.  Take pain medicines with a small amount of solid food, such  as crackers or toast. This helps prevent nausea.  When to call your healthcare provider  Call your healthcare provider right away if you have any of these:   You still have too much pain, or the pain gets worse, after taking the medicine. The medicine may not be strong enough. Or there may be a complication from the surgery.  You feel too sleepy, dizzy, or groggy. The medicine may be too strong.  Side effects such as nausea or vomiting. Your healthcare provider may advise taking other medicines to .  Skin changes such as rash, itching, or hives. This may mean you have an allergic reaction. Your provider may advise taking other medicines.  The incision looks different (for instance, part of it opens up).  Bleeding or fluid leaking from the incision site, and weren't told to expect that.  Fever of 100.4°F (38°C) or higher, or as directed by your provider.  Call 911  Call 911 right away if you have:   Trouble breathing  Facial swelling    If you have obstructive sleep apnea   You were given anesthesia medicine during surgery to keep you comfortable and free of pain. After surgery, you may have more apnea spells because of this medicine and other medicines you were given. The spells may last longer than normal.    At home:  Keep using the continuous positive airway pressure (CPAP) device when you sleep. Unless your healthcare provider tells you not to, use it when you sleep, day or night. CPAP is a common device used to treat obstructive sleep apnea.  Talk with your provider before taking any pain medicine, muscle relaxants, or sedatives. Your provider will tell you about the possible dangers of taking these medicines.  Contact your provider if your sleeping changes a lot even when taking medicines as directed.  Ron last reviewed this educational content on 10/1/2021  © 9143-3609 [employer]. All rights reserved. This information is not intended as a substitute for professional medical care. Always  follow your healthcare professional's instructions.       HOME INSTRUCTIONS  AMBSURG HOME CARE INSTRUCTIONS: POST-OP ANESTHESIA  The medication that you received for sedation or general anesthesia can last up to 24 hours. Your judgment and reflexes may be altered, even if you feel like your normal self.      We Recommend:   Do not drive any motor vehicle or bicycle   Avoid mowing the lawn, playing sports, or working with power tools/applicances (power saws, electric knives or mixers)   That you have someone stay with you on your first night home   Do not drink alcohol or take sleeping pills or tranquilizers   Do not sign legal documents within 24 hours of your procedure   If you had a nerve block for your surgery, take extra care not to put any pressure on your arm or hand for 24 hours    It is normal:  For you to have a sore throat if you had a breathing tube during surgery (while you were asleep!). The sore throat should get better within 48 hours. You can gargle with warm salt water (1/2 tsp in 4 oz warm water) or use a throat lozenge for comfort  To feel muscle aches or soreness especially in the abdomen, chest or neck. The achy feeling should go away in the next 24 hours  To feel weak, sleepy or \"wiped out\". Your should start feeling better in the next 24 hours.   To experience mild discomforts such as sore lip or tongue, headache, cramps, gas pains or a bloated feeling in your abdomen.   To experience mild back pain or soreness for a day or two if you had spinal or epidural anesthesia.   If you had laparoscopic surgery, to feel shoulder pain or discomfort on the day of surgery.   For some patients to have nausea after surgery/anesthesia    If you feel nausea or experience vomiting:   Try to move around less.   Eat less than usual or drink only liquids until the next morning   Nausea should resolve in about 24 hours    If you have a problem when you are at home:    Call your surgeons office

## 2024-02-17 VITALS
SYSTOLIC BLOOD PRESSURE: 133 MMHG | HEIGHT: 62 IN | BODY MASS INDEX: 18.22 KG/M2 | WEIGHT: 99 LBS | RESPIRATION RATE: 16 BRPM | OXYGEN SATURATION: 94 % | TEMPERATURE: 98 F | HEART RATE: 93 BPM | DIASTOLIC BLOOD PRESSURE: 68 MMHG

## 2024-02-17 LAB
ANION GAP SERPL CALC-SCNC: 5 MMOL/L (ref 0–18)
BUN BLD-MCNC: 16 MG/DL (ref 9–23)
BUN/CREAT SERPL: 22.5 (ref 10–20)
CALCIUM BLD-MCNC: 8.7 MG/DL (ref 8.7–10.4)
CHLORIDE SERPL-SCNC: 105 MMOL/L (ref 98–112)
CO2 SERPL-SCNC: 29 MMOL/L (ref 21–32)
CREAT BLD-MCNC: 0.71 MG/DL
EGFRCR SERPLBLD CKD-EPI 2021: 85 ML/MIN/1.73M2 (ref 60–?)
GLUCOSE BLD-MCNC: 113 MG/DL (ref 70–99)
HCT VFR BLD AUTO: 35.2 %
HGB BLD-MCNC: 11.4 G/DL
OSMOLALITY SERPL CALC.SUM OF ELEC: 290 MOSM/KG (ref 275–295)
POTASSIUM SERPL-SCNC: 4.5 MMOL/L (ref 3.5–5.1)
SODIUM SERPL-SCNC: 139 MMOL/L (ref 136–145)

## 2024-02-17 PROCEDURE — 97535 SELF CARE MNGMENT TRAINING: CPT

## 2024-02-17 PROCEDURE — 97530 THERAPEUTIC ACTIVITIES: CPT

## 2024-02-17 PROCEDURE — 85018 HEMOGLOBIN: CPT | Performed by: ORTHOPAEDIC SURGERY

## 2024-02-17 PROCEDURE — 85014 HEMATOCRIT: CPT | Performed by: ORTHOPAEDIC SURGERY

## 2024-02-17 PROCEDURE — 97166 OT EVAL MOD COMPLEX 45 MIN: CPT

## 2024-02-17 PROCEDURE — 80048 BASIC METABOLIC PNL TOTAL CA: CPT | Performed by: ORTHOPAEDIC SURGERY

## 2024-02-17 NOTE — PLAN OF CARE
Dulce is stable for discharge- reviewed discharge instructions w patient and sister- reviewed incision care, medications, follow up care, s/s infection- reinforcec activity precautions and sling/immobilizer all time. all questions answered. ourage pt to monitor pain and request assistance. Refused pain meds before discharge- only using ice packs. Saline lock removed. Sister will transport home.   Problem: PAIN - ADULT  Goal: Verbalizes/displays adequate comfort level or patient's stated pain goal  Description: INTERVENTIONS:  - Assess pain using appropriate pain scale  - Administer analgesics based on type and severity of pain and evaluate response  - Implement non-pharmacological measures as appropriate and evaluate response  - Consider cultural and social influences on pain and pain management  - Manage/alleviate anxiety  - Utilize distraction and/or relaxation techniques  - Monitor for opioid side effects  - Notify MD/LIP if interventions unsuccessful or patient reports new pain  - Anticipate increased pain with activity and pre-medicate as appropriate  Outcome: Progressing     Problem: RISK FOR INFECTION - ADULT  Goal: Absence of fever/infection during anticipated neutropenic period  Description: INTERVENTIONS  - Monitor WBC  - Administer growth factors as ordered  - Implement neutropenic guidelines  Outcome: Progressing     Problem: SAFETY ADULT - FALL  Goal: Free from fall injury  Description: INTERVENTIONS:  - Assess pt frequently for physical needs  - Identify cognitive and physical deficits and behaviors that affect risk of falls.  - [place] fall precautions as indicated by assessment.  - Educate pt/family on patient safety including physical limitations  - Instruct pt to call for assistance with activity based on assessment  - Modify environment to reduce risk of injury  - Provide assistive devices as appropriate  - Consider OT/PT consult to assist with strengthening/mobility  - Encourage toileting  schedule  Outcome: Progressing     Problem: DISCHARGE PLANNING  Goal: Discharge to home or other facility with appropriate resources  Description: INTERVENTIONS:  - Identify barriers to discharge w/pt and caregiver  - Include patient/family/discharge partner in discharge planning  - Arrange for needed discharge resources and transportation as appropriate  - Identify discharge learning needs (meds, wound care, etc)  - Arrange for interpreters to assist at discharge as needed  - Consider post-discharge preferences of patient/family/discharge partner  - Complete POLST form as appropriate  - Assess patient's ability to be responsible for managing their own health  - Refer to Case Management Department for coordinating discharge planning if the patient needs post-hospital services based on physician/LIP order or complex needs related to functional status, cognitive ability or social support system  Outcome: Progressing

## 2024-02-17 NOTE — PROGRESS NOTES
[facility]  Progress Note    Dulce Nicolas Patient Status:  Outpatient in a Bed    1940 MRN [id number]   Location [facility] 4W/SW/SE Attending Gabriella Murry MD   Hosp Day # 0 PCP Brent Mccall MD     SUBJECTIVE:  INTERVAL HISTORY: POD 1 s/p right rTSA  Patient is doing well. States pain is 10/10 but options for management are limited to ice. She is comfortable with this plan. Patient denies chest pain, shortness of breath, fevers, and calf pain. Sister present at bedside    OBJECTIVE:  Patient Vitals for the past 24 hrs:   BP Temp Temp src Pulse Resp SpO2 Weight   24 0857 133/68 98.2 °F (36.8 °C) Axillary 93 16 94 % --   24 0447 130/68 97.5 °F (36.4 °C) Axillary 71 16 97 % --   24 0108 -- -- -- -- -- -- 99 lb (44.9 kg)   24 2359 106/65 97.5 °F (36.4 °C) Axillary 77 16 94 % --   24 1937 119/68 -- -- 90 16 92 % --   24 1702 (!) 155/92 98.1 °F (36.7 °C) Axillary 92 16 91 % --   24 1403 139/68 -- -- 91 15 96 % --       ORTHO EXAM:   Patient sitting comfortably in bed.  Right upper extremity:  surgical dressing is clean dry and intact. Able to move all fingers, light touch sensation is intact. Capillary refill is normal.     LABORATORY:  Recent Labs   Lab 24  0413   HGB 11.4*   HCT 35.2      No results for input(s): \"PTP\", \"INR\" in the last 168 hours.      ASSESSMENT/PLAN:  POD 1 s/p right rTSA  Continue pain management with ice, patient with multiple allergies and unable to tolerate pain medicine. She is comfortable with this plan  DVT prophylaxis while in hospital denied by patient  Continue PT/OT  Discharge planning: home today if cleared by hospitalist  May remove bulky bandaging tomorrow, leave steri strips in place. OK to shower tomorrow  Continue medical management  Follow up in office with Gabriella Murry MD as scheduled    Adelita Dangelo PA-C  2024  1:58 PM

## 2024-02-17 NOTE — OCCUPATIONAL THERAPY NOTE
OCCUPATIONAL THERAPY EVALUATION - INPATIENT     Room Number: 409/409-A  Evaluation Date: 2/17/2024  Type of Evaluation: Initial  Presenting Problem: R TSA    Physician Order: IP Consult to Occupational Therapy  Reason for Therapy: ADL/IADL Dysfunction and Discharge Planning    OCCUPATIONAL THERAPY ASSESSMENT   Patient is a 83 year old female admitted 2/16/2024 for R reverse TSA; ok for wrist, hand, and elbow; immobilizer strap ok to removed when supported in chair and at rest per surgeon orders.  Prior to admission, patient's baseline was requiring assist with ADLs/IADLs from her sister who she is staying with .  Patient is currently functioning near baseline with  self care and basic mobility .  Patient is requiring  SBA-Mod A  as a result of the following impairments: decreased functional strength, decreased functional reach, difficulty maintaining precautions, and limited shoulder ROM. Occupational Therapy will continue to follow for duration of hospitalization.    Patient will benefit from continued skilled OT Services for 1-2 sessions if she stays to reinforce education, however, is safe to dc home today if cleared by medicine/ortho.    PLAN  OT Treatment Plan: ADL training;Compensatory technique education  OT Device Recommendations: None    OCCUPATIONAL THERAPY MEDICAL/SOCIAL HISTORY   Problem List   Active Problems:    * No active hospital problems. *    HOME SITUATION  Type of Home: House  Home Layout: Able to live on main level (3 stairs to basement)  Lives With: -- (will be staying with sister)  Toilet and Equipment: Standard height toilet  Shower/Tub and Equipment: Walk-in shower; Shower chair; Hand-held showerhead  Hand Dominance: -- (ambidextrious)  Drives: No    Stairs in Home: 3  Assistive Device(s) Used: SPC         SUBJECTIVE  I have a list of questions for you     OCCUPATIONAL THERAPY EXAMINATION   OBJECTIVE  Precautions: Limb alert - right (shoulder immobolizer; NWB)  Fall Risk: Standard fall  risk    WEIGHT BEARING RESTRICTION  R Upper Extremity: Non-Weight Bearing    PAIN ASSESSMENT  Ratin  Location: shoulder  Management Techniques: Repositioning      COGNITION  Overall Cognitive Status:  WFL - within functional limits    RANGE OF MOTION   Upper extremity ROM is within functional limits for LUE; RUE not assessed     ACTIVITIES OF DAILY LIVING ASSESSMENT  AM-PAC ‘6-Clicks’ Inpatient Daily Activity Short Form  How much help from another person does the patient currently need…  -   Putting on and taking off regular lower body clothing?: A Lot  -   Bathing (including washing, rinsing, drying)?: A Lot  -   Toileting, which includes using toilet, bedpan or urinal? : A Little  -   Putting on and taking off regular upper body clothing?: A Little  -   Taking care of personal grooming such as brushing teeth?: None  -   Eating meals?: None    AM-PAC Score:  Score: 18  Approx Degree of Impairment: 46.65%  Standardized Score (AM-PAC Scale): 38.66  CMS Modifier (G-Code): CK    FUNCTIONAL TRANSFER ASSESSMENT  Sit to Stand: Edge of Bed  Edge of Bed: Contact Guard Assist  Toilet Transfer: Stand-by Assist    BED MOBILITY  Rolling: Stand-by Assist  Supine to Sit : Minimal Assist  Sit to Supine (OT): Minimal Assist  Scooting: Min A    BALANCE ASSESSMENT  Static Sitting: Supervision  Static Standing: Stand-by Assist    FUNCTIONAL ADL ASSESSMENT  Eating: Supervision  Grooming Seated: Supervision  Bathing Seated: Minimal Assist  UB Dressing Seated: Moderate Assist  LB Dressing Seated: Minimal Assist  Toileting Seated: Minimal Assist       Skilled Therapy Provided:       RN provided consent to proceed with treatment. Per ordered protocol, therapist instructed patient on the following exercises to be completed at home; educated on prescribed frequency per orthopedic surgeon; if helper was present, they were also educated.   ROM of Elbow  Wrist AROM  Hand ROM    Demonstrated optimal positioning at rest and use of pillows to  provide additional support for surgical arm while in bed and up in chair.   Demonstrated and patient return demo'd bed mobility while maintaining NWB precautions.   Completed ADL retraining for LE/UE dressing and demo'd positioning strategies to protect shoulder while complete dressing, grooming and bathing tasks.   Educated and demonstrated sling management and how to adjust; stressed to patient that the initial stages of rehabilitation are focusing on gentle range of motion of wrist, hand, and elbow and protection of shoulder. Encouraged to follow up with surgeon if any concerns arise.     Patient verbalized understanding of all this education and indicated no further concerns     Recommend initial 24 hour SPV and assist as needed; was able to complete functional mobility in room with SPC with CGA/SBA. Patient is on track to d/c home; would recommend initial 24 hour supervision and support  at d/c.     All patient's questions answered at end of session and no further needs verbalized.         EDUCATION PROVIDED  Patient: Role of Occupational Therapy; Plan of Care; Discharge Recommendations; Functional Transfer Techniques; Fall Prevention; Weight Bear Status; Surgical Precautions; Posture/Positioning; Compensatory ADL Techniques  Patient's Response to Education: Verbalized Understanding; Returned Demonstration  Family/Caregiver: Role of Occupational Therapy; Plan of Care; Discharge Recommendations; Functional Transfer Techniques; Fall Prevention; Weight Bear Status; Surgical Precautions; Posture/Positioning; Compensatory ADL Techniques  Family/Caregiver's Response to Education: Verbalized Understanding; Returned Demonstration    The patient's Approx Degree of Impairment: 46.65% has been calculated based on documentation in the [facility] '6 clicks' Inpatient Daily Activity Short Form.  Research supports that patients with this level of impairment may benefit from home with support.  Final disposition will be made by  interdisciplinary medical team.    Patient End of Session: Up in chair;Needs met;Call light within reach;RN aware of session/findings;All patient questions and concerns addressed;Family present    OT Goals  Patient self-stated goal is: to return home     Patient will complete dressing with Min A  Comment:     Patient will complete toilet transfer with Mod I   Comment:     Patient will complete self care task at sink level with Mod I    Comment:     Comment:         Goals  on:   Frequency:1-2 more sessions (if patient stays, however, is safe to d/c from OT standpoint)    Patient Evaluation Complexity Level:   Occupational Profile/Medical History MODERATE - Expanded review of history including review of medical or therapy record   Specific performance deficits impacting engagement in ADL/IADL MODERATE  3 - 5 performance deficits   Client Assessment/Performance Deficits MODERATE - Comorbidities and min to mod modifications of tasks    Clinical Decision Making MODERATE - Analysis of occupational profile, detailed assessments, several treatment options    Overall Complexity MODERATE     OT Session Time: 45 minutes  Self-Care Home Management: 30 minutes  Therapeutic Activity: 15 minutes    Osmany Reddy, Occupational Therapist, OTR/L ext [phone]

## 2024-02-17 NOTE — DISCHARGE SUMMARY
General Medicine Discharge Summary     Patient ID:  Dulce Nicolas  83 year old  6/27/1940    Admit date: 2/16/2024    Discharge date and time: 2/17/24    Attending Physician: Gabriella Murry MD     Primary Care Physician: Brent Mccall MD     Discharge Diagnoses:   Anterior dislocation of right shoulder, initial encounter, acute pain of right shoulder, glenoid fracture of shoulder, right, closed initial encounter    Discharge Condition: stable    Disposition: home     Important Follow up:      Gabriella Murry MD  [address] [facility]  2ND FLOOR  [facility] [id number] [phone]    Follow up in 2 week(s)  For suture removal, for x-rays    Brent Mccall MD  [address] [facility]  [address]  [facility] [address] [phone]    Follow up        Hospital Course:      R shoulder dislocation/ fracture sp R reverse TSA 2/16/24 w Dr Murry  Post op pain  GERD  Fibromyalgia  Reflex symphathetic dystrophy  Hard of hearing  Elevated BP/ white coat HTN    Right reverse TSA 2/16/24 w Dr Murry  -home today, did well with PT/OT  -she does not apparently tolerate any pain medications     Hypertension   - continue her home dose of coreg     Fibromyalgia/ RSD  - resumed magnesium home med, holding mvi/ vit c/ calcium supplementation     GERD  - not on med      Consults: CONSULT TO ACUTE PAIN  IP CONSULT TO RESPIRATORY CARE  IP CONSULT TO HOSPITALIST    Operative Procedures:   Procedure(s) (LRB):  Right reverse total shoulder arthroplasty (Right)       Patient instructions:        Current Discharge Medication List        CONTINUE these medications which have NOT CHANGED    Details   carvedilol 12.5 MG Oral Tab Take 1 tablet (12.5 mg total) by mouth 2 (two) times daily with meals. Takes medication after lunch and after dinner      ARTIFICIAL TEAR SOLUTION OP Place 1 drop into both eyes 3 (three) times daily.      HONEY OR Take by mouth. 1 tsp daily      Homeopathic Products  ([facility]) Place under the tongue daily as needed.      MULTI-VITAMIN OR Take by mouth every morning.      CALCIUM + D OR Take 600 mg by mouth daily with dinner.      MAGNESIUM OR Take 500 mg by mouth every morning.      VITAMIN C OR Take 500 mg by mouth at bedtime.           Code Status: FULL    Exam on day of discharge:     Vitals:    02/17/24 0857   BP: 133/68   Pulse: 93   Resp: 16   Temp: 98.2 °F (36.8 °C)       General: no acute distress  Heart: RRR  Lungs: clear bilaterally  Abdomen: nontender  Extremities: no pedal edema     Total time coordinating care for discharge: Greater than 30 minutes    Yuer DO Hailey

## 2024-02-17 NOTE — OPERATIVE REPORT
[facility]    PATIENT'S NAME: MARISSA KELLOGG   ATTENDING PHYSICIAN: Gabriella Murry MD   OPERATING PHYSICIAN: Gabriella Murry MD   PATIENT ACCOUNT#:   [id number]    LOCATION:  [address] [place] [address]  MEDICAL RECORD #:   [id number]       YOB: 1940  ADMISSION DATE:       02/16/2024      OPERATION DATE:  02/16/2024    OPERATIVE REPORT      PREOPERATIVE DIAGNOSIS:    1.   Right shoulder anterior dislocation with instability.  2.   Right shoulder glenoid fracture.  POSTOPERATIVE DIAGNOSIS:    1.   Right shoulder anterior dislocation with instability.  2.   Right shoulder glenoid fracture.  3.   Rotator cuff arthropathy.  PROCEDURE:  Reverse total shoulder arthroplasty.    ASSISTANT:  DORIAN Hicks.  He was essential for performance of the procedure and assisted with patient positioning, prepping and draping, retraction of vital structures, and sling placement.     ANESTHESIA:  General and a block.    BLOOD LOSS:  Approximately 150 mL.    COMPLICATIONS:  None.    CONDITION:  The patient was aroused from anesthesia and transferred to recovery room in stable condition.      INSTRUMENTATION UTILIZED:  Tornier reverse total shoulder arthroplasty with a Perform 25 mm baseplate standard, with a 36 mm standard glenosphere, an Ascend 2B humeral stem with low-offset 36 mm tray at index 10 position, and 6 mm polyethylene component.     INDICATIONS:  The patient is an 83-year-old female who based on history and physical examination and imaging studies was diagnosed as having a right shoulder anterior dislocation with instability and glenoid fracture.  The risks, indications, and benefits of procedure of both operative and nonoperative treatment were thoroughly described to the patient, and the patient desired surgery.  The surgery recommended was a right reverse total shoulder arthroplasty, especially secondary to delay in presentation.  Risks include bleeding, infection, neurovascular injury,  chronic pain, chronic disability, failure of the procedure, stiffness, instability, potential need for additional surgery, as well as anesthetic complications including death.  The patient consented to the procedure.  All of her questions were answered, and she was in agreement with the treatment plan.      OPERATIVE TECHNIQUE:  On 02/16/2024, the patient was seen and evaluated preoperatively.  Her right shoulder was identified as the correct operative site.  My initials were placed.  She was transferred to the operating room and transferred to the operating table in supine position.  General anesthesia was induced.  Preoperatively, she received a block.  She was given vancomycin, gentamicin, and tranexamic acid as prophylaxis within 1 hour of incision time and was placed in a beach chair position.  Her right upper extremity was prepped and draped in a sterile fashion.  The patient had a longitudinal incision made from the coracoid process extending distally with blunt dissection through the deltopectoral interval and released of the conjoined tendon with retraction medially.  The cephalic vein was retracted laterally.  The 3 sisters at the inferior margin of the subscapularis were suture ligated.  The subscapularis was tagged.  The tenotomy was performed along the articular surface.  There was evidence of a fragmented glenoid fracture, as well as loose bodies and loose cartilaginous fragments within the glenohumeral joint that were removed.  The anterior-inferior capsule was released.  The anterior-inferior glenohumeral ligaments were released.  The humerus was dislocated.  There was evidence of a full-thickness cartilage loss to the humeral head and evidence of a full-thickness rotator cuff tear involving the supraspinatus and infraspinatus tendons.  The patient had an anatomic humeral head cut performed.  It was then reamed and broached to accommodate a 2B humeral stem.  The humerus was dislocated.  The glenoid  was exposed.  The inferior aspect of the glenoid was defined.  It was reamed to allow for placement of a 25 mm standard baseplate, which was placed with a central compression screw.  Superior and inferior locking screws were placed but not secured.  Anterior and posterior compression screws were placed, and then the locking screws were secured.  The wound was irrigated, and a 36 mm standard glenosphere was press-fit into place and the center set screw secured.  The humerus was dislocated.  Low offset humeral tray in index 10 position was trialed with a 6 mm polyethylene component and found to be of good fit and stability.  The trial was removed.  Three #2 FiberWire sutures were placed through the lesser tuberosity and the subscapularis for subscapularis repair.  The long head of the biceps tenotomy had been performed with exposure of the glenoid and resection of the labrum.  Tenodesis was performed to the superior margin of the pectoralis major tendon.  The intra-articular portion of the long head of the biceps tendon was excised.  On the back table, a Tornier 2B humeral stem was press-fit with a low-offset humeral tray at index 10 position and then press-fit into the proximal humerus.  A 6 mm polyethylene component was press-fit into place, and then the reverse total shoulder arthroplasty was reduced and found to be stable.  The wound was thoroughly and copiously irrigated and closed in layers with an 0 Vicryl deep fascial stitch, 2-0 Vicryl subcutaneous stitch, and a 3-0 Monocryl subcuticular stitch.  A large sterile bulky soft dressing was placed.  She was placed in an UltraSling, aroused from anesthesia, and transferred to the recovery room in stable condition.  Blood loss was approximately 150 mL.  Complications were none.  Condition, the patient was aroused from anesthesia and transferred to recovery room in stable condition.      DISPOSITION:  She was admitted to observation overnight for pain control, DVT  prophylaxis, postoperative medical management, and postoperative antibiotic therapy.  All of her questions were answered, and she was in agreement with the treatment plan.     Dictated By Gabriella Murry MD  d: 02/16/2024 11:58:00  t: 02/16/2024 17:46:21  [facility] [id number]  JEL/

## 2024-10-14 ENCOUNTER — OFFICE VISIT (OUTPATIENT)
Dept: RHEUMATOLOGY | Facility: CLINIC | Age: 84
End: 2024-10-14
Payer: MEDICARE

## 2024-10-14 VITALS
HEART RATE: 120 BPM | HEIGHT: 59 IN | TEMPERATURE: 99 F | SYSTOLIC BLOOD PRESSURE: 148 MMHG | RESPIRATION RATE: 16 BRPM | OXYGEN SATURATION: 96 % | DIASTOLIC BLOOD PRESSURE: 58 MMHG | BODY MASS INDEX: 21.97 KG/M2 | WEIGHT: 109 LBS

## 2024-10-14 DIAGNOSIS — M79.7 FIBROMYALGIA: Primary | ICD-10-CM

## 2024-10-14 DIAGNOSIS — G90.523 REFLEX SYMPATHETIC DYSTROPHY OF BOTH LOWER EXTREMITIES: ICD-10-CM

## 2024-10-14 DIAGNOSIS — C20 RECTAL CANCER (HCC): ICD-10-CM

## 2024-10-14 PROBLEM — Z85.3 HISTORY OF LEFT BREAST CANCER: Status: ACTIVE | Noted: 2024-10-14

## 2024-10-14 PROBLEM — Z98.890 H/O SHOULDER SURGERY: Status: ACTIVE | Noted: 2024-10-14

## 2024-10-14 RX ORDER — FUROSEMIDE 20 MG
20 TABLET ORAL 2 TIMES DAILY
COMMUNITY

## 2024-10-14 RX ORDER — HYDROCHLOROTHIAZIDE 25 MG/1
25 TABLET ORAL DAILY
COMMUNITY
Start: 2024-07-22

## 2024-10-14 NOTE — PROGRESS NOTES
[facility]  Dr. Maloney Progress Note     Subjective:   Dulce Nicolas is a(n) 84 year old female.   Current complaints:   Chief Complaint   Patient presents with    Fibromyalgia Syndrome     One year f/u. Total right shoulder replacement after broken shoulder. Found out has rectal and breast cancer. Going for surgery soon for the colon cancer. Finished radiation, couldn't tolerate chemo.    History of Fibromyalgia and RSD.  History of anterior shoulder dislocation with glenoid fracture.  Also rotator cuff arthropathy.  Right shoulder surgery 2/16/24 - total reverse replacement.  Can't tolerate pain medication.  PT sessions used up.  Has rectal cancer, needs an osteomy procedure.  Rectal cancer surgery coming up soon at [facility].   Also has left breast cancer which is localized.  Needs a lumpectomy  too..    Tried chemotherapy but couldn't tolerate it.  Seeing an oncologist at [facility].   Unable to tolerate fibromyalgia medicines.  Also unable to tolerate pain medications.  Using a heating pad or ice on her right shoulder today is painful.  Now on a diuretic for leg swelling.  Having trouble tolerating potassium supplement.  Objective:   /58   Pulse 120   Temp 98.9 °F (37.2 °C)   Resp 16   Ht 4' 11\" (1.499 m)   Wt 109 lb (49.4 kg)   SpO2 96%   BMI 22.02 kg/m²   Lungs are clear.  Heart normal sinus rhythm.  Right shoulder tender with decreased range of motion.  Legs with with trace edema.  Assessment:     Encounter Diagnoses   Name Primary?    Fibromyalgia Yes    Reflex sympathetic dystrophy of both lower extremities     Rectal cancer (HCC)      Plan:     Patient Instructions   Continue to use heat to the right shoulder on an as-needed basis.  After doing the heat treatment then try to do some mild stretching and range of motion exercises for the right shoulder.  Unfortunately due to your intolerance of pain medicines there is no prescription pain medicine she can take and  unfortunately no prescription fibromyalgia medications you can take.  If that he does not work sometimes you might put a cold pack on your shoulder.  There are no new medications for fibromyalgia or RSD.  Good luck with your upcoming rectal surgery and osteotomy treatment.  Following surgery at [facility], the discharge planner will arrange for your postoperative care.  Continue to try to walk as best you can with your cane.  Unfortunately once he rectal surgery is done at some point you will need to have your oncologist deal with the breast lump you have which appears to be cancer.  Most likely you will need a lumpectomy.  Return to my office for recheck of fibromyalgia and RSD in 1 year.        Wiliam Maloney MD 10/14/2024 10:46 AM

## 2024-10-14 NOTE — PATIENT INSTRUCTIONS
Continue to use heat to the right shoulder on an as-needed basis.  After doing the heat treatment then try to do some mild stretching and range of motion exercises for the right shoulder.  Unfortunately due to your intolerance of pain medicines there is no prescription pain medicine she can take and unfortunately no prescription fibromyalgia medications you can take.  If that he does not work sometimes you might put a cold pack on your shoulder.  There are no new medications for fibromyalgia or RSD.  Good luck with your upcoming rectal surgery and osteotomy treatment.  Following surgery at [facility], the discharge planner will arrange for your postoperative care.  Continue to try to walk as best you can with your cane.  Unfortunately once he rectal surgery is done at some point you will need to have your oncologist deal with the breast lump you have which appears to be cancer.  Most likely you will need a lumpectomy.  Return to my office for recheck of fibromyalgia and RSD in 1 year.

## 2024-12-28 ENCOUNTER — LAB REQUISITION (OUTPATIENT)
Dept: LAB | Age: 84
End: 2024-12-28

## 2024-12-28 DIAGNOSIS — E44.0 MODERATE PROTEIN-CALORIE MALNUTRITION  (CMD): ICD-10-CM

## 2024-12-28 LAB
ALBUMIN SERPL-MCNC: 2.5 G/DL (ref 3.4–5)
ALBUMIN/GLOB SERPL: 0.6 {RATIO} (ref 1–2.4)
ALP SERPL-CCNC: 104 UNITS/L (ref 45–117)
ALT SERPL-CCNC: 18 UNITS/L
ANION GAP SERPL CALC-SCNC: 8 MMOL/L (ref 7–19)
AST SERPL-CCNC: 11 UNITS/L
BASOPHILS # BLD: 0 K/MCL (ref 0–0.3)
BASOPHILS NFR BLD: 1 %
BILIRUB SERPL-MCNC: 0.5 MG/DL (ref 0.2–1)
BUN SERPL-MCNC: 17 MG/DL (ref 6–20)
BUN/CREAT SERPL: 34 (ref 7–25)
CALCIUM SERPL-MCNC: 10.1 MG/DL (ref 8.4–10.2)
CHLORIDE SERPL-SCNC: 101 MMOL/L (ref 97–110)
CO2 SERPL-SCNC: 34 MMOL/L (ref 21–32)
CREAT SERPL-MCNC: 0.5 MG/DL (ref 0.51–0.95)
DEPRECATED RDW RBC: 49.2 FL (ref 39–50)
EGFRCR SERPLBLD CKD-EPI 2021: >90 ML/MIN/{1.73_M2}
EOSINOPHIL # BLD: 0 K/MCL (ref 0–0.5)
EOSINOPHIL NFR BLD: 0 %
ERYTHROCYTE [DISTWIDTH] IN BLOOD: 14.8 % (ref 11–15)
FASTING DURATION TIME PATIENT: ABNORMAL H
GLOBULIN SER-MCNC: 3.9 G/DL (ref 2–4)
GLUCOSE SERPL-MCNC: 123 MG/DL (ref 70–99)
HCT VFR BLD CALC: 35.2 % (ref 36–46.5)
HGB BLD-MCNC: 11.4 G/DL (ref 12–15.5)
IMM GRANULOCYTES # BLD AUTO: 0 K/MCL (ref 0–0.2)
IMM GRANULOCYTES # BLD: 0 %
LYMPHOCYTES # BLD: 0.7 K/MCL (ref 1–4)
LYMPHOCYTES NFR BLD: 10 %
MCH RBC QN AUTO: 30.1 PG (ref 26–34)
MCHC RBC AUTO-ENTMCNC: 32.4 G/DL (ref 32–36.5)
MCV RBC AUTO: 92.9 FL (ref 78–100)
MONOCYTES # BLD: 0.8 K/MCL (ref 0.3–0.9)
MONOCYTES NFR BLD: 12 %
NEUTROPHILS # BLD: 5.3 K/MCL (ref 1.8–7.7)
NEUTROPHILS NFR BLD: 77 %
NRBC BLD MANUAL-RTO: 0 /100 WBC
PLATELET # BLD AUTO: 309 K/MCL (ref 140–450)
POTASSIUM SERPL-SCNC: 4 MMOL/L (ref 3.4–5.1)
PROT SERPL-MCNC: 6.4 G/DL (ref 6.4–8.2)
RBC # BLD: 3.79 MIL/MCL (ref 4–5.2)
SODIUM SERPL-SCNC: 139 MMOL/L (ref 135–145)
WBC # BLD: 6.8 K/MCL (ref 4.2–11)

## 2024-12-28 PROCEDURE — 85025 COMPLETE CBC W/AUTO DIFF WBC: CPT | Performed by: CLINICAL MEDICAL LABORATORY

## 2024-12-28 PROCEDURE — PSEU8250 COMPREHENSIVE METABOLIC PANEL: Performed by: CLINICAL MEDICAL LABORATORY

## 2024-12-28 PROCEDURE — 80053 COMPREHEN METABOLIC PANEL: CPT | Performed by: CLINICAL MEDICAL LABORATORY

## (undated) DEVICE — SUTURE COAT VCRL 0 27IN ABSRB VLT 26MM CT-2

## (undated) DEVICE — Device

## (undated) DEVICE — GUIDEWIRE SUR PIN L220MM DIA2.5MM DISP

## (undated) DEVICE — SOLUTION IRRIG 1000ML 0.9% NACL USP BTL

## (undated) DEVICE — SUTURE MCRYL SZ 3-0 L27IN ABSRB UD L24MM PS-1

## (undated) DEVICE — GLOVE GAMMEX PI HYBRID LF 6.5

## (undated) DEVICE — SUTURE FIBERWIRE SZ 2 L38IN NONABSORB BLU

## (undated) DEVICE — SPONGE LAP 18X18IN 7IN LOOP

## (undated) DEVICE — BANDAGE COHESIVE W4INXL5YD TAN E POR SLF ADH

## (undated) DEVICE — PACK CDS SHOULDER

## (undated) DEVICE — TAPE SUR W6INXL10YD WHT CLTH EZ TEAR PERF

## (undated) DEVICE — SUTURE COAT VCRL 0 27IN ABSRB UD 36MM CP-1

## (undated) DEVICE — SUTURE VCRL SZ 0 L27IN ABSRB UD L36MM CP-1

## (undated) DEVICE — SOLUTION IRRIG 3000ML 0.9% NACL FLX CONT

## (undated) DEVICE — BLADE SAW W19XL90MM CUT THK1.27MM GTS OSC

## (undated) DEVICE — DRAPE ORTH 60IN X 70IN POLY FLM ANCIL U RSST

## (undated) DEVICE — STRIP SKIN CLSR W0.5XL4IN REINF NINVAS DSGN

## (undated) DEVICE — BIT DRL DIA3.2MM PERIPH SCR REV AEQUALIS

## (undated) DEVICE — SYRINGE MED 12ML STD LUERLOCK NDL BOLD GRAD

## (undated) DEVICE — SOLUTION PREP 4OZ 3% H2O2 1ST AID ANTISEP

## (undated) DEVICE — MEGADYNE 2.75IN NEEDLE MONO

## (undated) DEVICE — PACK CDS UPPER EXTREMITY

## (undated) DEVICE — SUTURE ETHBND EXCEL SZ 1 L30IN NONABSORB

## (undated) DEVICE — DRAPE EQP 72X42IN CARM POLY

## (undated) DEVICE — CAST GRN 5YDX4IN GPSN S PLSTR

## (undated) DEVICE — NEEDLE HYPO 25X1-1/2

## (undated) DEVICE — DRAPE SHEET LG

## (undated) DEVICE — SUTURE ETHBND XL 1 30IN NABSRB GRN 36MM CT-1 1/2 CIR TAPR

## (undated) DEVICE — NEEDLE SUT 2-0 MENIS REP FIBERWIRE

## (undated) DEVICE — SUTURE VCRL SZ 2-0 L27IN ABSRB UD L24MM FS-1

## (undated) DEVICE — HANDPIECE IRRIG BTTRY OPERATED TYP W/ SUCT HI

## (undated) DEVICE — GLOVE GAMMEX PI ORTHO LF 7.0

## (undated) DEVICE — SUTURE VCRL 2-0 L27IN ABSRB UD L24MM FS-1 3/8

## (undated) DEVICE — SPONGE GZ 4XL4IN 100% COT 12 PLY TYP VII WVN

## (undated) DEVICE — SUTURE PROL SZ 3-0 L18IN NONABSORB BLU

## (undated) DEVICE — SYSTEM SEMI RIG LNR 3000CC W/ EL AND SELF

## (undated) DEVICE — COVER TBL W60XL90IN STD PUNC RESIST LO

## (undated) DEVICE — ZZDISC-NO SUB ADHESIVE LIQ 2/3CC TRNSPAR COMPND BENZ TINC

## (undated) DEVICE — APPLICATOR SKIN PREP 26ML HI LT ORNG 2% CHG

## (undated) DEVICE — SUTURE ETHLN SZ 3-0 L30IN NONABSORB BLK

## (undated) DEVICE — GLOVE ENCR MICRO S87 6.5